# Patient Record
Sex: MALE | Race: BLACK OR AFRICAN AMERICAN | Employment: OTHER | URBAN - METROPOLITAN AREA
[De-identification: names, ages, dates, MRNs, and addresses within clinical notes are randomized per-mention and may not be internally consistent; named-entity substitution may affect disease eponyms.]

---

## 2018-03-29 ENCOUNTER — HOSPITAL ENCOUNTER (EMERGENCY)
Age: 53
Discharge: HOME | End: 2018-03-29
Attending: EMERGENCY MEDICINE
Payer: MEDICARE

## 2018-03-29 ENCOUNTER — APPOINTMENT (OUTPATIENT)
Dept: GENERAL RADIOLOGY | Age: 53
End: 2018-03-29
Attending: EMERGENCY MEDICINE
Payer: MEDICARE

## 2018-03-29 VITALS
TEMPERATURE: 98.9 F | HEART RATE: 97 BPM | RESPIRATION RATE: 16 BRPM | SYSTOLIC BLOOD PRESSURE: 174 MMHG | HEIGHT: 70 IN | OXYGEN SATURATION: 100 % | BODY MASS INDEX: 34.65 KG/M2 | WEIGHT: 242 LBS | DIASTOLIC BLOOD PRESSURE: 72 MMHG

## 2018-03-29 DIAGNOSIS — S49.92XA SHOULDER INJURY, LEFT, INITIAL ENCOUNTER: Primary | ICD-10-CM

## 2018-03-29 DIAGNOSIS — S80.212A KNEE ABRASION, LEFT, INITIAL ENCOUNTER: ICD-10-CM

## 2018-03-29 PROCEDURE — A4565 SLINGS: HCPCS

## 2018-03-29 PROCEDURE — 73060 X-RAY EXAM OF HUMERUS: CPT

## 2018-03-29 PROCEDURE — 73562 X-RAY EXAM OF KNEE 3: CPT

## 2018-03-29 PROCEDURE — 99282 EMERGENCY DEPT VISIT SF MDM: CPT | Performed by: EMERGENCY MEDICINE

## 2018-03-29 PROCEDURE — 73000 X-RAY EXAM OF COLLAR BONE: CPT

## 2018-03-29 RX ORDER — DICLOFENAC SODIUM 50 MG/1
50 TABLET, DELAYED RELEASE ORAL 2 TIMES DAILY
Qty: 14 TAB | Refills: 0 | Status: SHIPPED | OUTPATIENT
Start: 2018-03-29 | End: 2021-01-11

## 2018-03-29 NOTE — ED PROVIDER NOTES
HPI Comments: 63-year-old gentleman presents with concerns about pain in his left shoulder and left knee after a fall yesterday. He said he thinks that his blood sugar dropped low and he got lightheaded and fell into a car. City's having severe left shoulder pain and some mild left knee pain. He is able to walk on his left knee with a limp but he says putting any weight on his left shoulder is extremely painful. Overall he rates the pain as a 10 out of 10. Denies hitting his head and has no neck or back pain. Triage plan: I will get x-rays of his knee and shoulder. Patient was seen in triage, a brief history and physical was done. Labs and/or other studies were ordered pending placement of patient in the back. Jenny Dickson. Angeles Nunez MD    Elements of this note were created using speech recognition software. As such, errors of speech recognition may be present. Patient is a 50 y.o. male presenting with fall. The history is provided by the patient. Fall   Pertinent negatives include no fever. Past Medical History:   Diagnosis Date    Diabetes ([address]     Hypertension        Past Surgical History:   Procedure Laterality Date    HX ORTHOPAEDIC      right hip pin         No family history on file. Social History     Social History    Marital status: LEGALLY      Spouse name: N/A    Number of children: N/A    Years of education: N/A     Occupational History    Not on file. Social History Main Topics    Smoking status: Current Every Day Smoker     Packs/day: 0.50     Years: 10.00    Smokeless tobacco: Not on file    Alcohol use Yes      Comment: occasionally    Drug use: No    Sexual activity: Not on file     Other Topics Concern    Not on file     Social History Narrative         ALLERGIES: Codeine    Review of Systems   Constitutional: Negative for chills and fever. Cardiovascular: Negative. Gastrointestinal: Negative.     Musculoskeletal: Positive for arthralgias, gait problem, joint swelling and myalgias. Skin: Negative for color change and wound. Vitals:    03/29/18 1645   BP: 174/72   Pulse: 97   Resp: 16   Temp: 98.9 °F (37.2 °C)   SpO2: 100%   Weight: 109.8 kg (242 lb)   Height: 5' 10\" (1.778 m)            Physical Exam   Constitutional: He is oriented to person, place, and time. He appears well-developed and well-nourished. Musculoskeletal:   Patient has tenderness to palpation over his left AC joint and left upper humerus. Also has some pain on the distal portion of the left clavicle. Patient is a minor abrasion to the left knee but has no ligamentous instability. He is tender over the area of the abrasion. Neurological: He is alert and oriented to person, place, and time. Skin: Skin is warm and dry. Nursing note and vitals reviewed. MDM  Number of Diagnoses or Management Options  Diagnosis management comments: I will check an x-ray of his shoulder and knee. X-rays are negative. I will discharge him home with a prescription for some medicine to help with the pain and a sling for comfort.         ED Course       Procedures

## 2018-03-29 NOTE — ED NOTES
I have reviewed discharge instructions with the patient. The patient verbalized understanding. Patient left ED via Discharge Method: ambulatory to Home with self. Opportunity for questions and clarification provided. Patient given 1 scripts. To continue your aftercare when you leave the hospital, you may receive an automated call from our care team to check in on how you are doing. This is a free service and part of our promise to provide the best care and service to meet your aftercare needs.  If you have questions, or wish to unsubscribe from this service please call [phone]. [facility].

## 2018-03-29 NOTE — DISCHARGE INSTRUCTIONS
Wear the sling as needed for comfort. Try to increase the range of motion in your shoulder a little every day until your back to a normal range of motion. Follow-up with your primary care doctor if you are not back to normal in 7-10 days.

## 2018-03-29 NOTE — ED TRIAGE NOTES
PMD-None. Pt c/o left shoulder and left knee pain after a fall yesterday. Abrasion noted to left knee.

## 2019-11-20 ENCOUNTER — APPOINTMENT (OUTPATIENT)
Dept: GENERAL RADIOLOGY | Age: 54
End: 2019-11-20
Attending: EMERGENCY MEDICINE
Payer: MEDICARE

## 2019-11-20 ENCOUNTER — HOSPITAL ENCOUNTER (EMERGENCY)
Age: 54
Discharge: HOME | End: 2019-11-20
Attending: EMERGENCY MEDICINE
Payer: MEDICARE

## 2019-11-20 VITALS
DIASTOLIC BLOOD PRESSURE: 88 MMHG | TEMPERATURE: 98 F | OXYGEN SATURATION: 100 % | WEIGHT: 238 LBS | BODY MASS INDEX: 34.07 KG/M2 | HEIGHT: 70 IN | SYSTOLIC BLOOD PRESSURE: 174 MMHG | RESPIRATION RATE: 16 BRPM | HEART RATE: 71 BPM

## 2019-11-20 DIAGNOSIS — M25.462 EFFUSION OF LEFT KNEE: ICD-10-CM

## 2019-11-20 DIAGNOSIS — M25.562 ACUTE PAIN OF LEFT KNEE: Primary | ICD-10-CM

## 2019-11-20 LAB
APPEARANCE FLD: NORMAL
COLOR FLD: NORMAL
GLUCOSE FLD-MCNC: NORMAL MG/DL
LYMPHOCYTES NFR BRONCH MANUAL: 3 %
NEUTROPHILS NFR BRONCH MANUAL: 97 %
NUC CELL # FLD: NORMAL /CU MM
RBC # FLD: NORMAL /CU MM
SPECIMEN SOURCE FLD: NORMAL
SPECIMEN SOURCE FLD: NORMAL

## 2019-11-20 PROCEDURE — 82945 GLUCOSE OTHER FLUID: CPT

## 2019-11-20 PROCEDURE — 87205 SMEAR GRAM STAIN: CPT

## 2019-11-20 PROCEDURE — 73562 X-RAY EXAM OF KNEE 3: CPT

## 2019-11-20 PROCEDURE — 75810000054 HC ARTHOCENTISIS JOINT: Performed by: EMERGENCY MEDICINE

## 2019-11-20 PROCEDURE — 99283 EMERGENCY DEPT VISIT LOW MDM: CPT | Performed by: EMERGENCY MEDICINE

## 2019-11-20 PROCEDURE — 89050 BODY FLUID CELL COUNT: CPT

## 2019-11-20 PROCEDURE — 89060 EXAM SYNOVIAL FLUID CRYSTALS: CPT

## 2019-11-20 PROCEDURE — 74011250637 HC RX REV CODE- 250/637: Performed by: EMERGENCY MEDICINE

## 2019-11-20 RX ORDER — HYDROCODONE BITARTRATE AND ACETAMINOPHEN 5; 325 MG/1; MG/1
1-2 TABLET ORAL
Qty: 17 TAB | Refills: 0 | Status: SHIPPED | OUTPATIENT
Start: 2019-11-20 | End: 2019-11-25

## 2019-11-20 RX ORDER — NAPROXEN 250 MG/1
500 TABLET ORAL
Status: COMPLETED | OUTPATIENT
Start: 2019-11-20 | End: 2019-11-20

## 2019-11-20 RX ORDER — OXYCODONE HYDROCHLORIDE 5 MG/1
5 TABLET ORAL
Status: COMPLETED | OUTPATIENT
Start: 2019-11-20 | End: 2019-11-20

## 2019-11-20 RX ADMIN — NAPROXEN 500 MG: 250 TABLET ORAL at 11:56

## 2019-11-20 RX ADMIN — OXYCODONE HYDROCHLORIDE 5 MG: 5 TABLET ORAL at 11:56

## 2019-11-20 NOTE — ED TRIAGE NOTES
Pt to triage via w/c with c/o left knee pain since yesterday after falling into a \"sink hole\" in his yard where a tree was cut down. Pt states he was in the hole up to waist but left leg was folded up and partially out of the hole. Pt reports swelling and pain when attempting to bear weight on the left leg.

## 2019-11-20 NOTE — ED NOTES
I have reviewed discharge instructions with the patient. The patient verbalized understanding. Patient left ED via Discharge Method: ambulatory to Home with (family). Opportunity for questions and clarification provided. Patient given 1 scripts. To continue your aftercare when you leave the hospital, you may receive an automated call from our care team to check in on how you are doing. This is a free service and part of our promise to provide the best care and service to meet your aftercare needs.  If you have questions, or wish to unsubscribe from this service please call [phone]. Thank you for Choosing our [address] [place] [address] Emergency Department.

## 2019-11-20 NOTE — ED PROVIDER NOTES
[facility] Emergency Department  Arrival Date/Time: 11/20/2019 10:33 AM      Deyanira Stone  MRN: [id number]    YOB: 1965   48 y.o. male    [facility] EMERGENCY DEPT FT12/12  Seen on 11/20/2019 @ 11:25 AM      Chief Complaint   Patient presents with    Knee Pain     HPI: 82-year-old male presents to the emergency department left knee pain. Patient stepped in a sink hole in his yard that was left after removing a tree. Weight went forward. Had sudden pain. Has a no effusion. Significant pain. Increased with movement. Slightly better with rest.   HPI    Historian: patient    Review of Systems:  No fever  No chills    Allergies: Allergies   Allergen Reactions    Codeine Rash     Pt denies allergy         Key Anti-Platelet Anticoagulant Meds     The patient is on no antiplatelet meds or anticoagulants. Physical Exam:  Nursing documentation reviewed. Vitals:    11/20/19 1027   BP: (!) 185/92   Pulse: 68   Resp: 16   Temp: 98.2 °F (36.8 °C)   SpO2: 100%    Vital signs were reviewed. Physical Exam  Constitutional:       Appearance: Normal appearance. Cardiovascular:      Rate and Rhythm: Normal rate. Musculoskeletal:      Left knee: He exhibits effusion. Skin:     General: Skin is warm and dry. Neurological:      Mental Status: He is alert. Left Knee Exam     Tenderness   The patient is experiencing tenderness in the lateral joint line, LCL, MCL and medial joint line.     Range of Motion   Extension: abnormal   Flexion: abnormal     Other   Erythema: present  Swelling: moderate  Effusion: effusion present                 MEDICAL DECISION MAKING:   This is a new problem that does need additional workup  Labs/Radiographs/ECG were ordered: yesno}  CT/US/XRay/MRI were visualized by me: yes    The patient's problem is: moderate  The Diagnostic Options are: minuimal risk  The Management Options are: moderate risk    Data:    Lab findings during this visit (only abnormal values will be noted, if no value noted then the result   was normal range): Labs Reviewed - No data to display     Radiology studies during this visit: Xr Knee Lt 3 V    Result Date: 11/20/2019  IMPRESSION: Mild osteoarthritis and large joint effusion        Medications given in the ED: Medications - No data to display     Recheck:   57-year-old male with large left knee effusion after injury yesterday. Will perform an arthrocentesis for comfort. Place patient knee immobilizer. Pain medication. Follow-up with orthopedics. Procedure Documentation: Arthrocentesis  Date/Time: 11/20/2019 11:27 AM  Performed by: Tevin Salinas MD  Authorized by: Tevin Salinas MD     Consent:     Consent obtained:  Verbal and written    Consent given by:  Patient    Risks discussed:  Bleeding and infection    Alternatives discussed:  No treatment and delayed treatment  Location:     Location:  Knee    Knee:  L knee  Anesthesia (see MAR for exact dosages): Anesthesia method:  Local infiltration    Local anesthetic:  Lidocaine 1% w/o epi  Procedure details:     Preparation: Patient was prepped and draped in usual sterile fashion      Needle gauge:  18 G    Ultrasound guidance: no      Approach:  Lateral    Aspirate amount:  60    Aspirate characteristics:  Bloody    Steroid injected: no      Specimen collected: yes    Post-procedure details:     Dressing:  Adhesive bandage    Patient tolerance of procedure: Tolerated well, no immediate complications         Assessment and Plan:      Impression:     ICD-10-CM ICD-9-CM   1. Acute pain of left knee M25.562 719.46   2.  Effusion of left knee M25.462 719.06        Condition at disposition: stable    Disposition: Discharged     Follow-up:   Follow-up Information     Follow up With Specialties Details Why [facility] EMERGENCY DEPT Emergency Medicine   [facility] [address]  Call today  [address] [place] [address] [phone]  [phone]           Discharge Medications:   Current Discharge Medication List      CONTINUE these medications which have CHANGED    Details   HYDROcodone-acetaminophen (NORCO) 5-325 mg per tablet Take 1-2 Tabs by mouth every six (6) hours as needed for Pain for up to 5 days. Max Daily Amount: 8 Tabs. Qty: 17 Tab, Refills: 0    Associated Diagnoses: Acute pain of left knee; Effusion of left knee              Cassie Warner MD; 11/20/2019 @11:25 AM          Past Medical History: Primary Care Doctor: Other, MD Rahul     Past Medical History:   Diagnosis Date    Diabetes ([address]     Hypertension      Past Surgical History:   Procedure Laterality Date    HX ORTHOPAEDIC      right hip pin     Social History     Tobacco Use    Smoking status: Current Every Day Smoker     Packs/day: 0.50     Years: 10.00     Pack years: 5.00   Substance Use Topics    Alcohol use: Yes     Comment: occasionally    Drug use: No      Home Medication:   Prior to Admission Medications   Prescriptions Last Dose Informant Patient Reported? Taking? HYDROcodone-acetaminophen (NORCO) 5-325 mg per tablet   No No   Sig: Take 1-2 Tabs by mouth every six (6) hours as needed for Pain. Max Daily Amount: 8 Tabs. Omeprazole delayed release (PRILOSEC D/R) 20 mg tablet   Yes No   Sig: Take 20 mg by mouth daily. amLODIPine (NORVASC) 5 mg tablet   No No   Sig: Take 1 Tab by mouth daily. diclofenac EC (VOLTAREN) 50 mg EC tablet   No No   Sig: Take 1 Tab by mouth two (2) times a day. insulin detemir (LEVEMIR) 100 unit/mL injection   Yes No   Sig: 10 Units by SubCUTAneous route every morning. lisinopril (PRINIVIL, ZESTRIL) 20 mg tablet   Yes No   Sig: Take 20 mg by mouth daily. methocarbamol (ROBAXIN) 750 mg tablet   No No   Sig: Take 1 Tab by mouth three (3) times daily. triamterene-hydrochlorothiazide (DYAZIDE) 37.5-25 mg per capsule   Yes No   Sig: Take 1 Cap by mouth daily. trimethoprim-sulfamethoxazole (BACTRIM DS, SEPTRA DS) 160-800 mg per tablet   No No   Sig: Take 1 Tab by mouth two (2) times a day.       Facility-Administered Medications: None

## 2019-11-20 NOTE — DISCHARGE INSTRUCTIONS
Rest  Ice knee  -- BIG/HUGE bag of ice to knee 30 minutes 3-4 times a day  Aleve 2 tablets 2 times a day

## 2019-11-22 LAB
BACTERIA SPEC CULT: NORMAL
BODY FLD TYPE: NORMAL
CRYSTALS FLD MICRO: NORMAL
GRAM STN SPEC: NORMAL
GRAM STN SPEC: NORMAL
SERVICE CMNT-IMP: NORMAL

## 2020-01-08 ENCOUNTER — HOSPITAL ENCOUNTER (EMERGENCY)
Age: 55
Discharge: HOME | End: 2020-01-08
Attending: EMERGENCY MEDICINE
Payer: MEDICARE

## 2020-01-08 VITALS
OXYGEN SATURATION: 97 % | TEMPERATURE: 98 F | SYSTOLIC BLOOD PRESSURE: 154 MMHG | HEART RATE: 90 BPM | RESPIRATION RATE: 16 BRPM | DIASTOLIC BLOOD PRESSURE: 80 MMHG | WEIGHT: 238 LBS | BODY MASS INDEX: 34.07 KG/M2 | HEIGHT: 70 IN

## 2020-01-08 DIAGNOSIS — T14.8XXA MUSCLE STRAIN: ICD-10-CM
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
## 2020-01-08 PROCEDURE — 99283 EMERGENCY DEPT VISIT LOW MDM: CPT | Performed by: EMERGENCY MEDICINE

## 2020-01-08 RX ORDER — METHOCARBAMOL 750 MG/1
750 TABLET, FILM COATED ORAL 3 TIMES DAILY
Qty: 30 TAB | Refills: 0 | Status: SHIPPED | OUTPATIENT
Start: 2020-01-08 | End: 2021-01-11

## 2020-01-08 NOTE — ED NOTES
I have reviewed discharge instructions with the patient. The patient verbalized understanding. Patient left ED via Discharge Method: ambulatory to Home with self    Opportunity for questions and clarification provided. Patient given 1 scripts. To continue your aftercare when you leave the hospital, you may receive an automated call from our care team to check in on how you are doing. This is a free service and part of our promise to provide the best care and service to meet your aftercare needs.  If you have questions, or wish to unsubscribe from this service please call [phone]. Thank you for Choosing our [facility] Emergency Department.
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
## 2020-06-26 ENCOUNTER — HOSPITAL ENCOUNTER (EMERGENCY)
Age: 55
Discharge: HOME | End: 2020-06-26
Attending: EMERGENCY MEDICINE
Payer: MEDICARE

## 2020-06-26 VITALS
HEIGHT: 70 IN | TEMPERATURE: 98.1 F | BODY MASS INDEX: 34.07 KG/M2 | SYSTOLIC BLOOD PRESSURE: 166 MMHG | OXYGEN SATURATION: 100 % | WEIGHT: 238 LBS | HEART RATE: 86 BPM | DIASTOLIC BLOOD PRESSURE: 98 MMHG | RESPIRATION RATE: 20 BRPM

## 2020-06-26 DIAGNOSIS — N48.30 PRIAPISM: Primary | ICD-10-CM

## 2020-06-26 LAB
ALBUMIN SERPL-MCNC: 3.3 G/DL (ref 3.5–5)
ALBUMIN/GLOB SERPL: 0.5 {RATIO} (ref 1.2–3.5)
ALP SERPL-CCNC: 106 U/L (ref 50–136)
ALT SERPL-CCNC: 31 U/L (ref 12–65)
ANION GAP SERPL CALC-SCNC: 7 MMOL/L (ref 7–16)
AST SERPL-CCNC: 34 U/L (ref 15–37)
BASOPHILS # BLD: 0.1 K/UL (ref 0–0.2)
BASOPHILS NFR BLD: 1 % (ref 0–2)
BILIRUB SERPL-MCNC: 0.8 MG/DL (ref 0.2–1.1)
BUN SERPL-MCNC: 19 MG/DL (ref 6–23)
CALCIUM SERPL-MCNC: 9.4 MG/DL (ref 8.3–10.4)
CHLORIDE SERPL-SCNC: 106 MMOL/L (ref 98–107)
CO2 SERPL-SCNC: 23 MMOL/L (ref 21–32)
CREAT SERPL-MCNC: 1.34 MG/DL (ref 0.8–1.5)
DIFFERENTIAL METHOD BLD: ABNORMAL
EOSINOPHIL # BLD: 0.3 K/UL (ref 0–0.8)
EOSINOPHIL NFR BLD: 3 % (ref 0.5–7.8)
ERYTHROCYTE [DISTWIDTH] IN BLOOD BY AUTOMATED COUNT: 12.5 % (ref 11.9–14.6)
GLOBULIN SER CALC-MCNC: 6.1 G/DL (ref 2.3–3.5)
GLUCOSE SERPL-MCNC: 105 MG/DL (ref 65–100)
HCT VFR BLD AUTO: 49.8 % (ref 41.1–50.3)
HGB BLD-MCNC: 16.1 G/DL (ref 13.6–17.2)
IMM GRANULOCYTES # BLD AUTO: 0 K/UL (ref 0–0.5)
IMM GRANULOCYTES NFR BLD AUTO: 0 % (ref 0–5)
LYMPHOCYTES # BLD: 0.5 K/UL (ref 0.5–4.6)
LYMPHOCYTES NFR BLD: 6 % (ref 13–44)
MCH RBC QN AUTO: 33.1 PG (ref 26.1–32.9)
MCHC RBC AUTO-ENTMCNC: 32.3 G/DL (ref 31.4–35)
MCV RBC AUTO: 102.3 FL (ref 79.6–97.8)
MONOCYTES # BLD: 0.3 K/UL (ref 0.1–1.3)
MONOCYTES NFR BLD: 4 % (ref 4–12)
NEUTS SEG # BLD: 7.2 K/UL (ref 1.7–8.2)
NEUTS SEG NFR BLD: 85 % (ref 43–78)
NRBC # BLD: 0 K/UL (ref 0–0.2)
PLATELET # BLD AUTO: 298 K/UL (ref 150–450)
PMV BLD AUTO: 11.1 FL (ref 9.4–12.3)
POTASSIUM SERPL-SCNC: 4.4 MMOL/L (ref 3.5–5.1)
PROT SERPL-MCNC: 9.4 G/DL (ref 6.3–8.2)
RBC # BLD AUTO: 4.87 M/UL (ref 4.23–5.6)
SODIUM SERPL-SCNC: 136 MMOL/L (ref 136–145)
WBC # BLD AUTO: 8.4 K/UL (ref 4.3–11.1)

## 2020-06-26 PROCEDURE — 74011250636 HC RX REV CODE- 250/636: Performed by: EMERGENCY MEDICINE

## 2020-06-26 PROCEDURE — 74011000250 HC RX REV CODE- 250: Performed by: EMERGENCY MEDICINE

## 2020-06-26 PROCEDURE — 80053 COMPREHEN METABOLIC PANEL: CPT

## 2020-06-26 PROCEDURE — 99283 EMERGENCY DEPT VISIT LOW MDM: CPT

## 2020-06-26 PROCEDURE — 85025 COMPLETE CBC W/AUTO DIFF WBC: CPT

## 2020-06-26 PROCEDURE — 75810000279 HC INJ/IRRIG FOR PRIAPISM

## 2020-06-26 RX ORDER — LIDOCAINE HYDROCHLORIDE 10 MG/ML
10 INJECTION INFILTRATION; PERINEURAL
Status: COMPLETED | OUTPATIENT
Start: 2020-06-26 | End: 2020-06-26

## 2020-06-26 RX ADMIN — PHENYLEPHRINE HYDROCHLORIDE 10 ML: 10 INJECTION INTRAVENOUS at 17:45

## 2020-06-26 RX ADMIN — LIDOCAINE HYDROCHLORIDE 10 ML: 10 INJECTION, SOLUTION INFILTRATION; PERINEURAL at 17:45

## 2020-06-26 NOTE — ED NOTES
I have reviewed discharge instructions with the patient. The patient verbalized understanding. Patient left ED via Discharge Method: ambulatory to Home with son. Opportunity for questions and clarification provided. Patient given 0 scripts. To continue your aftercare when you leave the hospital, you may receive an automated call from our care team to check in on how you are doing. This is a free service and part of our promise to provide the best care and service to meet your aftercare needs.  If you have questions, or wish to unsubscribe from this service please call [phone]. Thank you for Choosing our [facility] Emergency Department.

## 2020-06-26 NOTE — ED NOTES
Report received from [facility] to assume patient care at this time. Per asher RN pt awaiting blood work results for discharge.

## 2020-06-26 NOTE — ED PROVIDER NOTES
Néstor De Oliveira  4:48 PM  Patient seen by me in triage. Has had an erection since 5 AM this morning. No previous similar episodes. Has high blood pressure and diabetes no history of sickle cell disease. Patient will be seen by provider in the back      Néstor De Oliveira.  Patient seen by me in the back in the ER. Remains with erection. Urology consulted. The history is provided by the patient. No  was used. Penis Pain   This is a new problem. Episode onset: 0500. The problem occurs constantly. The problem has not changed since onset. Primary symptoms include penile pain and priapism. The symptoms occur spontaneously. Pertinent negatives include no nausea, no vomiting, no abdominal pain and no diarrhea. Past Medical History:   Diagnosis Date    Diabetes ([facility] [address]     Hypertension        Past Surgical History:   Procedure Laterality Date    HX ORTHOPAEDIC      right hip pin         No family history on file.     Social History     Socioeconomic History    Marital status: SINGLE     Spouse name: Not on file    Number of children: Not on file    Years of education: Not on file    Highest education level: Not on file   Occupational History    Not on file   Social Needs    Financial resource strain: Not on file    Food insecurity     Worry: Not on file     Inability: Not on file    Transportation needs     Medical: Not on file     Non-medical: Not on file   Tobacco Use    Smoking status: Current Every Day Smoker     Packs/day: 0.50     Years: 10.00     Pack years: 5.00   Substance and Sexual Activity    Alcohol use: Yes     Comment: occasionally    Drug use: No    Sexual activity: Not on file   Lifestyle    Physical activity     Days per week: Not on file     Minutes per session: Not on file    Stress: Not on file   Relationships    Social connections     Talks on phone: Not on file     Gets together: Not on file     Attends Episcopalian service: Not on file     Active member of club or organization: Not on file     Attends meetings of clubs or organizations: Not on file     Relationship status: Not on file    Intimate partner violence     Fear of current or ex partner: Not on file     Emotionally abused: Not on file     Physically abused: Not on file     Forced sexual activity: Not on file   Other Topics Concern    Not on file   Social History Narrative    Not on file         ALLERGIES: Codeine    Review of Systems   Constitutional: Negative for chills and fever. Eyes: Negative for pain and redness. Respiratory: Negative for chest tightness, shortness of breath and wheezing. Cardiovascular: Negative for chest pain and leg swelling. Gastrointestinal: Negative for abdominal pain, diarrhea, nausea and vomiting. Genitourinary: Positive for penile pain. Musculoskeletal: Negative for back pain, gait problem, neck pain and neck stiffness. Skin: Negative for color change and rash. Neurological: Negative for weakness, numbness and headaches. Vitals:    06/26/20 1646   BP: 174/70   Pulse: 100   Resp: 16   Temp: 98.4 °F (36.9 °C)   SpO2: 98%   Weight: 108 kg (238 lb)   Height: 5' 10\" (1.778 m)            Physical Exam  Constitutional:       Appearance: He is well-developed. HENT:      Head: Normocephalic and atraumatic. Neck:      Musculoskeletal: Normal range of motion and neck supple. Cardiovascular:      Rate and Rhythm: Normal rate and regular rhythm. Pulmonary:      Effort: Pulmonary effort is normal.      Breath sounds: Normal breath sounds. Abdominal:      General: Bowel sounds are normal.      Palpations: Abdomen is soft. Tenderness: There is no abdominal tenderness. Genitourinary:     Comments: Priapism. Musculoskeletal: Normal range of motion. Skin:     General: Skin is warm and dry. Neurological:      Mental Status: He is alert and oriented to person, place, and time.           MDM  Number of Diagnoses or Management Options  Diagnosis management comments: Urology consulted. Will come and drain penis. Will discharge with follow up.         Amount and/or Complexity of Data Reviewed  Clinical lab tests: ordered and reviewed  Tests in the medicine section of CPT®: ordered and reviewed    Patient Progress  Patient progress: stable         Procedures

## 2020-06-26 NOTE — DISCHARGE INSTRUCTIONS
Patient Education        Priapism (Prolonged Erection): Care Instructions  Your Care Instructions     Priapism (say \"APJ-cr-xhm-um\") is an erection that does not go away. This happens when the penis fills with fluid without sexual stimulation. And it does not go away after orgasm. It may last on and off for days or weeks. The cause of priapism is often not known. But it can happen to men who have sickle cell disease or diabetes. Taking or using medicine for erection problems may also cause priapism. Your doctor may have removed blood from your penis to ease the pain. And he or she may have given you a shot of medicine to soften the erection. Follow-up care is a key part of your treatment and safety. Be sure to make and go to all appointments, and call your doctor if you are having problems. It's also a good idea to know your test results and keep a list of the medicines you take. How can you care for yourself at home? · Put ice or a cold pack on the area for 10 to 20 minutes at a time. Put a thin cloth between the ice and your skin. · Urinate often. Don't wait until you feel the need. · Avoid the medicine that may have caused the painful erection. When should you call for help? Call your doctor now or seek immediate medical care if:  · You have a new painful erection that does not go away. Watch closely for changes in your health, and be sure to contact your doctor if:  · You do not get better as expected. Where can you learn more? Go to http://sapphire-jose.info/  Enter B643 in the search box to learn more about \"Priapism (Prolonged Erection): Care Instructions. \"  Current as of: February 11, 2020               Content Version: 12.5  © 8099-7925 [employer]wise, Incorporated. Care instructions adapted under license by [employer] (which disclaims liability or warranty for this information).  If you have questions about a medical condition or this instruction, always ask your healthcare professional. [address] any warranty or liability for your use of this information.

## 2020-06-26 NOTE — ED TRIAGE NOTES
Pt states he woke up at 0500 this morning with an erection. States it has not gone down and is now painful. Denies this happening before. Ambulatory to triage wearing mask.

## 2020-06-26 NOTE — ED NOTES
Collin Arvizu notified of pt BP at this time, pt states \"I have 2 blood pressure pills I take in the monring\". Per MD continue with discharge at this time.

## 2020-06-27 NOTE — CONSULTS
[address]    Name:  Eduard Brasher  MR#:  [id number]  :  1965  ACCOUNT #:  [de-identified]  DATE OF SERVICE:  2020    CONSULTING DOCTOR:  Tameka Martinez  REASON FOR CONSULTATION:  Priapism. HISTORY OF PRESENT ILLNESS:  A 53-year-old -American male woke this morning with an erection. The erection did not go down and got more severe as the day progressed. He came to the [facility] in considerable pain from priapism since this morning. He denies any illicit drug use. He is not taking trazodone. He does not have a history of sickle cell anemia. He has otherwise been healthy. He does have some high blood pressure and takes one medication for that. He has had no recent medication changes. PAST MEDICAL HISTORY:  Includes diabetes and hypertension. MEDICATIONS:  Robaxin 750 mg t.i.d., Voltaren, Prilosec. He takes insulin, Prinivil, Norvasc. PHYSICAL EXAMINATION:  Healthy male in moderate distress. His penis is circumcised with a very erect penis. Testicles are normal.    After prepping the shaft of the penis with Betadine, I used lidocaine to infiltrate the skin on the lateral surface of the left penile shaft. A butterfly needle was inserted. I used a 10 mL syringe to aspirate a very old venous tight blood. We also had a solution of phenylephrine at a concentration of 100 mcg/mL. This was injected periodically, had 1 mL samples for a total of 10 mL. I also placed another butterfly syringe on the right side in a similar fashion in alternated aspiration between the left and the right side. After about 15 minutes of this, we were able to get the penis to go completely flaccid. He tolerated this well. As noted, we had injected a total of 10 mL of the phenylephrine as well. After removing the butterfly needles, pressure was applied to the penile shaft manually. He did not have any repeat of his priapism.     ASSESSMENT:  Idiopathic priapism. RECOMMENDATION:  I told the patient to get some Sudafed so that he could take it if he started to have another erection. I will follow him up in our office next week. He is going to have a CBC drawn prior to discharge.       Tamara Lewis MD      [id number]  D:  06/26/2020 18:25  T:  06/26/2020 21:27  JOB #:  [id number]

## 2021-01-11 ENCOUNTER — HOSPITAL ENCOUNTER (OUTPATIENT)
Dept: SURGERY | Age: 56
Discharge: HOME | End: 2021-01-11
Attending: ORTHOPAEDIC SURGERY
Payer: MEDICARE

## 2021-01-11 ENCOUNTER — HOSPITAL ENCOUNTER (OUTPATIENT)
Dept: PHYSICAL THERAPY | Age: 56
Discharge: HOME | End: 2021-01-11
Attending: ORTHOPAEDIC SURGERY
Payer: MEDICARE

## 2021-01-11 ENCOUNTER — HOME HEALTH ADMISSION (OUTPATIENT)
Dept: HOME HEALTH SERVICES | Facility: HOME HEALTH | Age: 56
End: 2021-01-11
Payer: MEDICARE

## 2021-01-11 DIAGNOSIS — R06.83 SNORING: Primary | ICD-10-CM

## 2021-01-11 LAB
AMORPH CRY URNS QL MICRO: ABNORMAL
ANION GAP SERPL CALC-SCNC: 7 MMOL/L (ref 7–16)
APPEARANCE UR: ABNORMAL
APTT PPP: 26.1 SEC (ref 24.1–35.1)
ATRIAL RATE: 56 BPM
BACTERIA SPEC CULT: NORMAL
BACTERIA URNS QL MICRO: ABNORMAL /HPF
BASOPHILS # BLD: 0.1 K/UL (ref 0–0.2)
BASOPHILS NFR BLD: 2 % (ref 0–2)
BILIRUB UR QL: NEGATIVE
BUN SERPL-MCNC: 19 MG/DL (ref 6–23)
CALCIUM SERPL-MCNC: 9.2 MG/DL (ref 8.3–10.4)
CALCULATED P AXIS, ECG09: 35 DEGREES
CALCULATED R AXIS, ECG10: 44 DEGREES
CALCULATED T AXIS, ECG11: 45 DEGREES
CASTS URNS QL MICRO: ABNORMAL /LPF
CHLORIDE SERPL-SCNC: 105 MMOL/L (ref 98–107)
CO2 SERPL-SCNC: 27 MMOL/L (ref 21–32)
COLOR UR: YELLOW
CREAT SERPL-MCNC: 1.64 MG/DL (ref 0.8–1.5)
CRYSTALS URNS QL MICRO: 0 /LPF
DIAGNOSIS, 93000: NORMAL
DIFFERENTIAL METHOD BLD: ABNORMAL
EOSINOPHIL # BLD: 0.3 K/UL (ref 0–0.8)
EOSINOPHIL NFR BLD: 5 % (ref 0.5–7.8)
EPI CELLS #/AREA URNS HPF: ABNORMAL /HPF
ERYTHROCYTE [DISTWIDTH] IN BLOOD BY AUTOMATED COUNT: 12.3 % (ref 11.9–14.6)
EST. AVERAGE GLUCOSE BLD GHB EST-MCNC: NORMAL MG/DL
GLUCOSE SERPL-MCNC: 101 MG/DL (ref 65–100)
GLUCOSE UR STRIP.AUTO-MCNC: NEGATIVE MG/DL
HBA1C MFR BLD: 4.3 % (ref 4.2–6.3)
HCT VFR BLD AUTO: 43.4 % (ref 41.1–50.3)
HGB BLD-MCNC: 14.6 G/DL (ref 13.6–17.2)
HGB UR QL STRIP: NEGATIVE
IMM GRANULOCYTES # BLD AUTO: 0 K/UL (ref 0–0.5)
IMM GRANULOCYTES NFR BLD AUTO: 0 % (ref 0–5)
INR PPP: 0.9
KETONES UR QL STRIP.AUTO: ABNORMAL MG/DL
LEUKOCYTE ESTERASE UR QL STRIP.AUTO: ABNORMAL
LYMPHOCYTES # BLD: 2 K/UL (ref 0.5–4.6)
LYMPHOCYTES NFR BLD: 35 % (ref 13–44)
MCH RBC QN AUTO: 34 PG (ref 26.1–32.9)
MCHC RBC AUTO-ENTMCNC: 33.6 G/DL (ref 31.4–35)
MCV RBC AUTO: 100.9 FL (ref 79.6–97.8)
MONOCYTES # BLD: 0.5 K/UL (ref 0.1–1.3)
MONOCYTES NFR BLD: 9 % (ref 4–12)
MUCOUS THREADS URNS QL MICRO: 0 /LPF
NEUTS SEG # BLD: 2.8 K/UL (ref 1.7–8.2)
NEUTS SEG NFR BLD: 49 % (ref 43–78)
NITRITE UR QL STRIP.AUTO: NEGATIVE
NRBC # BLD: 0 K/UL (ref 0–0.2)
OTHER OBSERVATIONS,UCOM: ABNORMAL
P-R INTERVAL, ECG05: 134 MS
PH UR STRIP: 7 [PH] (ref 5–9)
PLATELET # BLD AUTO: 263 K/UL (ref 150–450)
PMV BLD AUTO: 11.7 FL (ref 9.4–12.3)
POTASSIUM SERPL-SCNC: 4 MMOL/L (ref 3.5–5.1)
PROT UR STRIP-MCNC: >300 MG/DL
PROTHROMBIN TIME: 13 SEC (ref 12.5–14.7)
Q-T INTERVAL, ECG07: 444 MS
QRS DURATION, ECG06: 102 MS
QTC CALCULATION (BEZET), ECG08: 428 MS
RBC # BLD AUTO: 4.3 M/UL (ref 4.23–5.6)
RBC #/AREA URNS HPF: ABNORMAL /HPF
SERVICE CMNT-IMP: NORMAL
SODIUM SERPL-SCNC: 139 MMOL/L (ref 136–145)
SP GR UR REFRACTOMETRY: 1.02 (ref 1–1.02)
TRICHOMONAS UR QL MICRO: ABNORMAL
UROBILINOGEN UR QL STRIP.AUTO: 1 EU/DL (ref 0.2–1)
VENTRICULAR RATE, ECG03: 56 BPM
WBC # BLD AUTO: 5.7 K/UL (ref 4.3–11.1)
WBC URNS QL MICRO: >100 /HPF

## 2021-01-11 PROCEDURE — 36415 COLL VENOUS BLD VENIPUNCTURE: CPT

## 2021-01-11 PROCEDURE — 80048 BASIC METABOLIC PNL TOTAL CA: CPT

## 2021-01-11 PROCEDURE — 85610 PROTHROMBIN TIME: CPT

## 2021-01-11 PROCEDURE — 87641 MR-STAPH DNA AMP PROBE: CPT

## 2021-01-11 PROCEDURE — 85730 THROMBOPLASTIN TIME PARTIAL: CPT

## 2021-01-11 PROCEDURE — 93005 ELECTROCARDIOGRAM TRACING: CPT

## 2021-01-11 PROCEDURE — 81001 URINALYSIS AUTO W/SCOPE: CPT

## 2021-01-11 PROCEDURE — 77030027138 HC INCENT SPIROMETER -A

## 2021-01-11 PROCEDURE — 83036 HEMOGLOBIN GLYCOSYLATED A1C: CPT

## 2021-01-11 PROCEDURE — 87086 URINE CULTURE/COLONY COUNT: CPT

## 2021-01-11 PROCEDURE — 97161 PT EVAL LOW COMPLEX 20 MIN: CPT

## 2021-01-11 PROCEDURE — 81015 MICROSCOPIC EXAM OF URINE: CPT

## 2021-01-11 PROCEDURE — 85025 COMPLETE CBC W/AUTO DIFF WBC: CPT

## 2021-01-11 RX ORDER — PRAVASTATIN SODIUM 40 MG/1
40 TABLET ORAL
COMMUNITY
End: 2021-01-11

## 2021-01-11 RX ORDER — ATORVASTATIN CALCIUM 40 MG/1
40 TABLET, FILM COATED ORAL
COMMUNITY

## 2021-01-11 RX ORDER — IBUPROFEN 200 MG
1 TABLET ORAL EVERY 24 HOURS
COMMUNITY

## 2021-01-11 RX ORDER — CHOLECALCIFEROL (VITAMIN D3) 125 MCG
CAPSULE ORAL DAILY
COMMUNITY

## 2021-01-11 RX ORDER — CHOLECALCIFEROL (VITAMIN D3) 125 MCG
CAPSULE ORAL AS NEEDED
COMMUNITY
End: 2021-01-11

## 2021-01-11 RX ORDER — GABAPENTIN 400 MG/1
400 CAPSULE ORAL
COMMUNITY
End: 2021-01-11

## 2021-01-11 RX ORDER — HYDROCODONE BITARTRATE AND ACETAMINOPHEN 5; 325 MG/1; MG/1
1 TABLET ORAL
COMMUNITY
End: 2021-02-04

## 2021-01-11 NOTE — PERIOP NOTES
Recent Results (from the past 8 hour(s))   CBC WITH AUTOMATED DIFF    Collection Time: 01/11/21 10:50 AM   Result Value Ref Range    WBC 5.7 4.3 - 11.1 K/uL    RBC 4.30 4.23 - 5.6 M/uL    HGB 14.6 13.6 - 17.2 g/dL    HCT 43.4 41.1 - 50.3 %    .9 (H) 79.6 - 97.8 FL    MCH 34.0 (H) 26.1 - 32.9 PG    MCHC 33.6 31.4 - 35.0 g/dL    RDW 12.3 11.9 - 14.6 %    PLATELET 250 935 - 469 K/uL    MPV 11.7 9.4 - 12.3 FL    ABSOLUTE NRBC 0.00 0.0 - 0.2 K/uL    DF AUTOMATED      NEUTROPHILS 49 43 - 78 %    LYMPHOCYTES 35 13 - 44 %    MONOCYTES 9 4.0 - 12.0 %    EOSINOPHILS 5 0.5 - 7.8 %    BASOPHILS 2 0.0 - 2.0 %    IMMATURE GRANULOCYTES 0 0.0 - 5.0 %    ABS. NEUTROPHILS 2.8 1.7 - 8.2 K/UL    ABS. LYMPHOCYTES 2.0 0.5 - 4.6 K/UL    ABS. MONOCYTES 0.5 0.1 - 1.3 K/UL    ABS. EOSINOPHILS 0.3 0.0 - 0.8 K/UL    ABS. BASOPHILS 0.1 0.0 - 0.2 K/UL    ABS. IMM.  GRANS. 0.0 0.0 - 0.5 K/UL   HEMOGLOBIN A1C WITH EAG    Collection Time: 01/11/21 10:50 AM   Result Value Ref Range    Hemoglobin A1c 4.3 4.20 - 6.30 %    Est. average glucose Cannot be calculated mg/dL   METABOLIC PANEL, BASIC    Collection Time: 01/11/21 10:50 AM   Result Value Ref Range    Sodium 139 136 - 145 mmol/L    Potassium 4.0 3.5 - 5.1 mmol/L    Chloride 105 98 - 107 mmol/L    CO2 27 21 - 32 mmol/L    Anion gap 7 7 - 16 mmol/L    Glucose 101 (H) 65 - 100 mg/dL    BUN 19 6 - 23 MG/DL    Creatinine 1.64 (H) 0.8 - 1.5 MG/DL    GFR est AA 57 (L) >60 ml/min/1.73m2    GFR est non-AA 47 (L) >60 ml/min/1.73m2    Calcium 9.2 8.3 - 10.4 MG/DL   PROTHROMBIN TIME + INR    Collection Time: 01/11/21 10:50 AM   Result Value Ref Range    Prothrombin time 13.0 12.5 - 14.7 sec    INR 0.9     PTT    Collection Time: 01/11/21 10:50 AM   Result Value Ref Range    aPTT 26.1 24.1 - 35.1 SEC

## 2021-01-11 NOTE — PROGRESS NOTES
Joint Camp Case Management note:  Patient screened in Prehab for discharge planning needs. Patient scheduled for a future total joint replacement. We discussed Home Health and equipment needed after surgery. List of Home Health providers offered. Patient w/o preference towards provider. Initial referral sent to [facility].  Will need a walker and 3-1 BSC

## 2021-01-11 NOTE — PERIOP NOTES
Patient verified name and . Order for consent    found in EHR and matches case posting; patient verified. Type 3 surgery, joint camp assessment complete. Labs per surgeon: CBC,BMP, PT/PTT, Hgb A1c ; results creatinine 1.64~abnormal~Tatiana Alanis NP made aware. Labs per anesthesia protocol: no additional  EKG:completed today per protocol and within anesthesia guidelines. Placed on chart for reference. ECHO 19; stress 19 EKG 19 available in Care EVerywhere for anesthesia reference. Pt c/o of foul odor when urinates; Urine specimen/culture collected per Evette Valencia NP order. Patient COVID test date 21 1010; Patient aware. The testing center [address] provided to the patient. MRSA/MSSA swab collected; pharmacy to review and dose antibiotic as appropriate. Hospital approved surgical skin cleanser and instructions to return bottle on DOS given per hospital policy. Patient provided with handouts including Guide to Surgery, Pain Management, Hand Hygiene, Blood Transfusion Education, and [facility] Brochure. Patient answered medical/surgical history questions at their best of ability. All prior to admission medications documented in [facility]. Original medication prescription bottle not visualized during patient appointment. Patient instructed to hold all vitamins 3 weeks prior to surgery and NSAIDS 5 days prior to surgery. Patient teach back successful and patient demonstrates knowledge of instruction.

## 2021-01-11 NOTE — PERIOP NOTES
PLEASE CONTINUE TAKING ALL PRESCRIPTION MEDICATIONS UP TO THE DAY OF SURGERY UNLESS OTHERWISE DIRECTED BELOw  DISCONTINUE all vitamins and supplements 21 days prior to surgery. DISCONTINUE Non-Steriodal Anti-Inflammatory (NSAIDS) such as Advil, Motrin, Aleve; Aspirin 5 days prior to surgery. Home Medications to take  the day of surgery    Amlodipine           Home Medications   to Hold   Avoid Tylenol/Acetaminophen products for 24 hours prior to surgery arrival   Avoid [place] for 24 hours prior to surgery arrival     Comments    Bring incentive spirometer and remaining Hibiclens on day of surgery      *Visitor policy of 1 visitor per patient discussed. Please do not bring home medications with you on the day of surgery unless otherwise directed by your nurse. If you are instructed to bring home medications, please give them to your nurse as they will be administered by the nursing staff. If you have any questions, please call [facility] Juanis Mckenzie [phone] or [facility] [phone]. A copy of this note was provided to the patient for reference.

## 2021-01-11 NOTE — PROGRESS NOTES
Dallas Born  : (50 y.o.) Joint Camp at [address], [facility] [address].  [phone]       Physical Therapy Prehab Plan of Treatment and Evaluation Summary:2021    ICD-10: Treatment Diagnosis:   · Pain in Right Hip (M25.551)  · Stiffness of Right Hip, Not elsewhere classified (M25.651)  · Difficulty in walking, Not elsewhere classified (R26.2)  Precautions/Allergies:   Codeine  MEDICAL/REFERRING DIAGNOSIS:  Ankylosis, right hip [M24.651]  REFERRING PHYSICIAN: Keven North,*  DATE OF SURGERY: 21    Assessment:   Comments:  Pt. Lives alone and plans to go home with support from his best friend. He states he has had a right michael in the past.  His right hip and knee rom is limted especially his hip flexion rom. He states he needs a right tka as well. PROBLEM LIST (Impacting functional limitations):  Mr. Joan Beckwith presents with the following right lower extremity(s) problems:  1. Strength  2. Range of Motion  3. Home Exercise Program  4. Pain   INTERVENTIONS PLANNED:  1. Home Exercise Program  2. Educational Discussion      TREATMENT PLAN: Effective Dates: 2021 TO 2021. Frequency/Duration: Patient to continue to perform home exercise program at least twice per day up until his surgery. GOALS: (Goals have been discussed and agreed upon with patient.)  Discharge Goals: Time Frame: 1 Day  1. Patient will demonstrate independence with a home exercise program designed to increase strength, range of motion and pain control to minimize functional deficits and optimize patient for total joint replacement. Rehabilitation Potential For Stated Goals: Good  Regarding Subhash Minor's therapy, I certify that the treatment plan above will be carried out by a therapist or under their direction.   Thank you for this referral,  Louis Pod, PT               HISTORY:   Present Symptoms:  Pain Intensity 1: (10 at worst)  Pain Location 1: Hip, Knee History of Present Injury/Illness (Reason for Referral):  Medical/Referring Diagnosis: Ankylosis, right hip [M24.651]   Past Medical History/Comorbidities:   Mr. Amanda Werner  has a past medical history of Arthritis, Current every day smoker, Diabetes ([facility] [address] Hypertension, and Psychiatric disorder (2019). He also has no past medical history of COPD, Dementia, Endocrine disease, Gastrointestinal disorder, Infectious disease, Neurological disorder, Other ill-defined conditions(799.89), or Sleep disorder. Mr. Amanda Werner  has a past surgical history that includes hx orthopaedic and hx colonoscopy.   Social History/Living Environment:   Home Environment: Private residence  # Steps to Enter: 1  Rails to Enter: No  One/Two Story Residence: One story  Living Alone: Yes  Support Systems: Friends \ neighbors  Patient Expects to be Discharged to[de-identified] Private residence  Current DME Used/Available at Home: Cane, straight  Tub or Shower Type: Tub/Shower combination  Work/Activity:  disabled  Dominant Side:  LEFT  Current Medications:  See Pre-assessment nursing note   Number of Personal Factors/Comorbidities that affect the Plan of Care: 1-2: MODERATE COMPLEXITY   EXAMINATION:   ADLs (Current Functional Status):   Ambulation:  [x] Independent  [] Walk Indoors Only  [] Walk Outdoors  [] Use Assistive Device  [] Use Wheelchair Only Dressing:  [] Independent  Requires Assistance from Someone for:  [x] Sock/Shoes  [] Pants  [] Everything   Bathing/Showering:   [x] Independent  [] Requires Assistance from Someone  [] [address] Only Household Activities:  [x] Routine house and yard work  [] Light Housework Only  [] None   Observation/Orthostatic Postural Assessment:   Exceptions to WDLRounded shoulders, Trunk flexion, Leg length discrepancy, right  ROM/Flexibility:   Gross Assessment: Yes  AROM: Within functional limits(left LE)                       RLE Assessment  RLE Assessment (WDL): Exceptions to WDL(right hip <3/5)  RLE AROM  R Hip Flexion: 80  R Hip ABduction: 10  R Knee Extension: -10   Strength:   Gross Assessment: Yes  Strength: Within functional limits(left LE)                  Functional Mobility:    Gross Assessment: Yes    Gait Description (WDL): Exceptions to WDL  Stand to Sit: Additional time, Independent  Sit to Stand: Independent, Additional time  Distance (ft): 200 Feet (ft)  Ambulation - Level of Assistance: Independent  Speed/Larisa: Delayed  Stance: Right decreased  Gait Abnormalities: Antalgic;Trunk sway increased          Balance:    Sitting: Intact  Standing: Intact   Body Structures Involved:  1. Bones  2. Joints  3. Muscles  4. Ligaments Body Functions Affected:  1. Movement Related Activities and Participation Affected:  1. Mobility   Number of elements that affect the Plan of Care: 3: MODERATE COMPLEXITY   CLINICAL PRESENTATION:   Presentation: Stable and uncomplicated: LOW COMPLEXITY   CLINICAL DECISION MAKING:   Outcome Measure: Tool Used: Lower Extremity Functional Scale (LEFS)  Score:  Initial: 17/80 Most Recent: X/80 (Date: -- )   Interpretation of Score: 20 questions each scored on a 5 point scale with 0 representing \"extreme difficulty or unable to perform\" and 4 representing \"no difficulty\". The lower the score, the greater the functional disability. 80/80 represents no disability. Minimal detectable change is 9 points. Medical Necessity:   · Mr. Eura Hammans is expected to optimize his lower extremity strength and ROM in preparation for joint replacement surgery. Reason for Services/Other Comments:  · Achieve baseline assesment of musculoskeletal system, functional mobility and home environment. , educate in PT HEP in preparation for surgery, educate in hospital plan of care. Use of outcome tool(s) and clinical judgement create a POC that gives a: Clear prediction of patient's progress: LOW COMPLEXITY   TREATMENT:   Treatment/Session Assessment:  Patient was instructed in PT- HEP to increase strength and ROM in LEs. Answered all questions. · Post session pain:  Hip and knee pain  · Compliance with Program/Exercises: compliant most of the time.   Total Treatment Duration:  PT Patient Time In/Time Out  Time In: 1115  [facility]: [facility],

## 2021-01-12 VITALS
RESPIRATION RATE: 16 BRPM | TEMPERATURE: 98.7 F | HEIGHT: 70 IN | WEIGHT: 211 LBS | HEART RATE: 67 BPM | OXYGEN SATURATION: 95 % | DIASTOLIC BLOOD PRESSURE: 99 MMHG | BODY MASS INDEX: 30.21 KG/M2 | SYSTOLIC BLOOD PRESSURE: 178 MMHG

## 2021-01-12 NOTE — PROGRESS NOTES
01/11/21 1030   Oxygen Therapy   O2 Sat (%) 98 %   Pulse via Oximetry 92 beats per minute   O2 Device Room air   Pre-Treatment   Breath Sounds Bilateral Clear   Pt's symptoms include:    Snoring  Tiredness- excessive daytime sleepiness  HTN  GERD  Neck size       40.5       cm  Modified Renteria stage 4  SACS Score 13  STOP BANG 5  [place] Sleepiness scale 7  Height   5   '  10  \"   Weight  211   lbs  BMI 30.28      Sleepiness Scale:     Sitting and reading 1    Watching TV 1    Sitting inactive in a public place 1    As a passenger in a car for an hour without a break 1    Lying down to rest in the afternoon when circumstances Permits 1    Sitting and talking to someone 0    Sitting quietly after lunch without alcohol 2    In a car, while stopped for a few minutes 0    Total :  7    Refer patient for sleep study based on above assessment. Initial respiratory Assessment completed with pt. Pt was interviewed and evaluated in Joint camp prior to surgery. Patient ID:  Maria Del Carmen Carcamo  [id number]  50 y.o.  1965  Surgeon: Dr. Paco Machuca  Date of Surgery: 2/2/2021  Procedure: Total Right Hip Arthroplasty  Primary Care Physician: Nisreen, MD Rahul None  Specialists:     Pt. IS SOMEWHAT PRACTICING SOCIAL DISTANCING AND WEARING A MASK WHEN IN PUBLIC. Pt taught proper COUGH technique  DIAPHRAGMATIC BREATHING EXERCISE INSTRUCTIONS GIVEN    History of smoking:   CURRENT SMOKER-     1.2     PPD for   10         YEARS  Smoking Cessation  \"SMOKING: YOUR PLAN TO QUIT\" handout given to pt. Pt taught to identify when anxiety or stress  is a trigger . Relaxation breathing technique taught to pt.                  Quit date:       PT STATES HE IS TRYING TO QUIT  Secondhand smoke:MOTHER    Past procedures with Oxygen desaturation or delayed awakening:DENIES    Past Medical History:   Diagnosis Date    Arthritis     bilateral knees and right hip    Current every day smoker     trying to quit    Diabetes ([facility] [address]     type 2; no medications; does not check blood sugar    History of priapism     Hypertension     Poor historian     Psychiatric disorder 2019    situational panic attack x 1      PT HAD EPISODE OF WHEEZING 8/25/2019 AND USED MDI AT THAT TIME BUT NOT CURRENTLY  Respiratory history:DENIES SOB                                                                  Respiratory meds:  DENIES    FAMILY PRESENT:             NO                                                   PAST SLEEP STUDY:                      DENIES  HX OF TRACY:                                       DENIES  TRACY assessment:                                               SLEEPS ON SIDE         PHYSICAL EXAM   Body mass index is 30.28 kg/m².    Visit Vitals  BP (!) 178/99 (BP 1 Location: Left arm, BP Patient Position: Sitting)   Pulse 67   Temp 98.7 °F (37.1 °C)   Resp 16   Ht 5' 10\" (1.778 m)   Wt 95.7 kg (211 lb)   SpO2 95%   BMI 30.28 kg/m²     Neck circumference:    40.5  cm    Loud snoring:                                                 YES        Witnessed apnea or wakening gasping or choking:        DENIES      Awakens with headaches:                                               DENIES  Morning or daytime tiredness/ sleepiness:                                TIRED  Dry mouth or sore throat in morning:                   DENIES                                   Renteria stage: 4                                    SACS score:13  Stop Bang   STOP-BANG  Does the patient snore loudly (louder than talking or loud enough to be heard through closed doors)?: Yes  Does the patient often feel tired, fatigued, or sleepy during the daytime, even after a \"good\" night's sleep?: Yes  Has anyone ever observed the patient stop breathing during their sleep? : No  Does the patient have or are they being treated for high blood pressure?: Yes  Is the patient's BMI greater than 35?: No  Is your neck circumference greater than 17 inches (Male) or 16 inches (Female)?: No  Is the patient older than 48?: Yes  Is the patient male?: Yes  TRACY Score: 5  Has the patient been referred to Sleep Medicine?: Yes  Has the patient previously been diagnosed with Obstructive Sleep Apnea?: No                                     [facility]                               Referrals:  [facility]  Pt.  Phone Number:  [phone]

## 2021-01-12 NOTE — PERIOP NOTES
Follow up to chart. UA results - 4+ bacteria, >300 Pro, >100 wbc, nitrite neg. Preliminary culture- no growth. No documentation as yet from NP re: elevated Creat/ pt c/o foul odor of urine. Will post chart for further f/u post NP documentation/orders.

## 2021-01-13 PROBLEM — R79.89 ELEVATED SERUM CREATININE: Status: ACTIVE | Noted: 2021-01-13

## 2021-01-13 PROBLEM — R06.83 SNORING: Status: ACTIVE | Noted: 2021-01-13

## 2021-01-13 NOTE — ADVANCED PRACTICE NURSE
Total Joint Surgery Preoperative Chart Review      Patient ID:  Eliceo Jarrett  [id number]  50 y.o.  1965  Surgeon: Dr. Jeff Salinas  Date of Surgery: 2/2/2021  Procedure: Total Right Hip Arthroplasty  Primary Care Physician: Nisreen, MD Rahul None  Specialty Physician(s):      Subjective:   Eliceo Jarrett is a 50 y.o. BLACK OR  male who presents for preoperative evaluation for Total Right Hip arthroplasty. This is a preoperative chart review note based on data collected by the nurse at the surgical Pre-Assessment visit. Past Medical History:   Diagnosis Date    Arthritis     bilateral knees and right hip    Current every day smoker     trying to quit    Diabetes ([facility] [address]     type 2; no medications; does not check blood sugar    History of priapism     Hypertension     Poor historian     Psychiatric disorder 2019    situational panic attack x 1      Past Surgical History:   Procedure Laterality Date    HX COLONOSCOPY      HX ORTHOPAEDIC      right hip pin     Family History   Problem Relation Age of Onset    Cancer Mother     Lung Disease Mother       Social History     Tobacco Use    Smoking status: Current Every Day Smoker     Packs/day: 0.50     Years: 10.00     Pack years: 5.00    Smokeless tobacco: Never Used   Substance Use Topics    Alcohol use: Yes     Comment: occasionally       Prior to Admission medications    Medication Sig Start Date End Date Taking? Authorizing Provider   HYDROcodone-acetaminophen (Norco) 5-325 mg per tablet Take 1 Tab by mouth every eight (8) hours as needed for Pain. Indications: pain   Yes Provider, Historical   cholecalciferol, vitamin D3, (Vitamin D3) 50 mcg (2,000 unit) tab Take  by mouth daily. Indications: prevention of vitamin D deficiency   Yes Provider, Historical   atorvastatin (LIPITOR) 40 mg tablet Take 40 mg by mouth nightly.  Indications: high cholesterol   Yes Provider, Historical   nicotine (NICODERM CQ) 14 mg/24 hr patch 1 Patch by TransDERmal route every twenty-four (24) hours. Indications: stop smoking   Yes Provider, Historical   lisinopril (PRINIVIL, ZESTRIL) 20 mg tablet Take 20 mg by mouth daily. Indications: high blood pressure   Yes Other, MD Rahul   amLODIPine (NORVASC) 5 mg tablet Take 1 Tab by mouth daily. Patient taking differently: Take 5 mg by mouth daily. Take / use AM day of surgery  per anesthesia protocols. Indications: high blood pressure 11/17/11  Yes Femi Jeffers PA     Allergies   Allergen Reactions    Codeine Rash     Pt denies allergy          Objective:     Physical Exam:   No data found. ECG:    EKG Results     Procedure 720 Value Units Date/Time    EKG, 12 LEAD, INITIAL [[id number]] Collected: 01/11/21 1220    Order Status: Completed Updated: 01/11/21 1425     Ventricular Rate 56 BPM      Atrial Rate 56 BPM      P-R Interval 134 ms      QRS Duration 102 ms      Q-T Interval 444 ms      QTC Calculation (Bezet) 428 ms      Calculated P Axis 35 degrees      Calculated R Axis 44 degrees      Calculated T Axis 45 degrees      Diagnosis --     Sinus bradycardia  Otherwise normal ECG  When compared with ECG of 03-JAN-2012 19:05,  No significant change was found  Confirmed by MARQUISE ALSTON (), [place] ([id number]) on 1/11/2021 2:25:03 PM            Data Review:   Labs:     Labs reviewed  With elevated creatinine 1.6. UA reviewed. No significant culture growth. Problem List:  )  Patient Active Problem List   Diagnosis Code    Elevated serum creatinine R79.89    Snoring R06.83       Total Joint Surgery Pre-Assessment Recommendations:           Patient with elevated BP today but did not take BP medications. UA pending. Patient will need to follow up with PCP in future r/t elevated creatinine. UA reviewed. No significant culture growth. Patient reports the symptoms of snoring, fatigue, observed apnea and /or excessive daytime sleepiness. Will refer patient for HST based on above assessment.    Recommend continuous saturation monitoring hours of sleep, during hospitalization.         Signed By: Lorice Rinne, NP-C    January 13, 2021

## 2021-01-14 LAB
BACTERIA SPEC CULT: NORMAL
BACTERIA SPEC CULT: NORMAL
SERVICE CMNT-IMP: NORMAL

## 2021-01-20 ENCOUNTER — HOSPITAL ENCOUNTER (OUTPATIENT)
Dept: SLEEP MEDICINE | Age: 56
Discharge: HOME | End: 2021-01-20
Payer: MEDICARE

## 2021-01-20 PROCEDURE — 95806 SLEEP STUDY UNATT&RESP EFFT: CPT

## 2021-01-29 NOTE — H&P
[id number] [facility]  History and Physical Exam    Patient ID:  Alondra Garcia  [id number]    85 y.o.  1965    Today: January 29, 2021    Vitals Signs: Reviewed as noted in medical record. Allergies: Allergies   Allergen Reactions    Codeine Rash     Pt denies allergy       CC: Right hip pain    HPI:  Pt complains of right hip pain and difficulty ambulating. Relevant PMH:   Past Medical History:   Diagnosis Date    Arthritis     bilateral knees and right hip    Current every day smoker     trying to quit    Diabetes ([address]     type 2; no medications; does not check blood sugar    History of priapism     Hypertension     Poor historian     Psychiatric disorder 2019    situational panic attack x 1       Objective:                    HEENT: NC/AT                   Lungs:  clear                   Heart:   rrr                   Abdomen: soft                   Extremities:  LROM and pain about right hip joint    Radiographs: reveal osteoarthritis with loss of joint space and bone spurs. Assessment: Arthrofibrosis of right hip joint [M24.651]    Plan:  Proceed with scheduled Procedure(s) (LRB):  RIGHT HIP ARTHROPLASTY TOTAL CONVERSION/ DEPUY (Right) . The patient has failed conservative treatment including NSAIDS, and injections. Due to the amount of pain the patient is experiencing we will proceed with scheduled procedure. It is also felt that the patient is high risk for postoperative complication due to history of multiple chronic medical problems.   The patient may potentially spend 2 nights in the hospital.      Signed By: DAVID Batista  January 29, 2021

## 2021-02-01 ENCOUNTER — ANESTHESIA EVENT (OUTPATIENT)
Dept: SURGERY | Age: 56
DRG: 470 | End: 2021-02-01
Payer: MEDICARE

## 2021-02-02 ENCOUNTER — ANESTHESIA (OUTPATIENT)
Dept: SURGERY | Age: 56
DRG: 470 | End: 2021-02-02
Payer: MEDICARE

## 2021-02-02 ENCOUNTER — HOSPITAL ENCOUNTER (INPATIENT)
Age: 56
LOS: 2 days | Discharge: HOME HEALTH | DRG: 470 | End: 2021-02-04
Attending: ORTHOPAEDIC SURGERY | Admitting: ORTHOPAEDIC SURGERY
Payer: MEDICARE

## 2021-02-02 ENCOUNTER — APPOINTMENT (OUTPATIENT)
Dept: GENERAL RADIOLOGY | Age: 56
DRG: 470 | End: 2021-02-02
Attending: PHYSICIAN ASSISTANT
Payer: MEDICARE

## 2021-02-02 ENCOUNTER — APPOINTMENT (OUTPATIENT)
Dept: GENERAL RADIOLOGY | Age: 56
DRG: 470 | End: 2021-02-02
Attending: ORTHOPAEDIC SURGERY
Payer: MEDICARE

## 2021-02-02 DIAGNOSIS — Z96.641 STATUS POST TOTAL HIP REPLACEMENT, RIGHT: Primary | ICD-10-CM

## 2021-02-02 PROBLEM — M16.11 OSTEOARTHRITIS OF RIGHT HIP: Status: ACTIVE | Noted: 2021-02-02

## 2021-02-02 LAB
ABO + RH BLD: NORMAL
BLOOD GROUP ANTIBODIES SERPL: NORMAL
GLUCOSE BLD STRIP.AUTO-MCNC: 91 MG/DL (ref 65–100)
HGB BLD-MCNC: 10.2 G/DL (ref 13.6–17.2)
SPECIMEN EXP DATE BLD: NORMAL

## 2021-02-02 PROCEDURE — 77030006788 HC BLD SAW OSC STRY -B: Performed by: ORTHOPAEDIC SURGERY

## 2021-02-02 PROCEDURE — 74011250637 HC RX REV CODE- 250/637: Performed by: PHYSICIAN ASSISTANT

## 2021-02-02 PROCEDURE — 0SR904A REPLACEMENT OF RIGHT HIP JOINT WITH CERAMIC ON POLYETHYLENE SYNTHETIC SUBSTITUTE, UNCEMENTED, OPEN APPROACH: ICD-10-PCS | Performed by: ORTHOPAEDIC SURGERY

## 2021-02-02 PROCEDURE — 74011250637 HC RX REV CODE- 250/637: Performed by: ORTHOPAEDIC SURGERY

## 2021-02-02 PROCEDURE — 74011000250 HC RX REV CODE- 250: Performed by: REGISTERED NURSE

## 2021-02-02 PROCEDURE — 82962 GLUCOSE BLOOD TEST: CPT

## 2021-02-02 PROCEDURE — 74011000258 HC RX REV CODE- 258: Performed by: ORTHOPAEDIC SURGERY

## 2021-02-02 PROCEDURE — 77030006790 HC BLD SAW OSC ZIMM -B: Performed by: ORTHOPAEDIC SURGERY

## 2021-02-02 PROCEDURE — 74011250636 HC RX REV CODE- 250/636: Performed by: PHYSICIAN ASSISTANT

## 2021-02-02 PROCEDURE — 72170 X-RAY EXAM OF PELVIS: CPT

## 2021-02-02 PROCEDURE — 77030016544 HC BLD SAW RECIP1 STRY -B: Performed by: ORTHOPAEDIC SURGERY

## 2021-02-02 PROCEDURE — 77030025006 HC BUR STRY -C: Performed by: ORTHOPAEDIC SURGERY

## 2021-02-02 PROCEDURE — 76010000172 HC OR TIME 2.5 TO 3 HR INTENSV-TIER 1: Performed by: ORTHOPAEDIC SURGERY

## 2021-02-02 PROCEDURE — 74011250636 HC RX REV CODE- 250/636: Performed by: ORTHOPAEDIC SURGERY

## 2021-02-02 PROCEDURE — 0SP904Z REMOVAL OF INTERNAL FIXATION DEVICE FROM RIGHT HIP JOINT, OPEN APPROACH: ICD-10-PCS | Performed by: ORTHOPAEDIC SURGERY

## 2021-02-02 PROCEDURE — 77030018547 HC SUT ETHBND1 J&J -B: Performed by: ORTHOPAEDIC SURGERY

## 2021-02-02 PROCEDURE — C1776 JOINT DEVICE (IMPLANTABLE): HCPCS | Performed by: ORTHOPAEDIC SURGERY

## 2021-02-02 PROCEDURE — 74011000250 HC RX REV CODE- 250: Performed by: ANESTHESIOLOGY

## 2021-02-02 PROCEDURE — 74011250636 HC RX REV CODE- 250/636: Performed by: REGISTERED NURSE

## 2021-02-02 PROCEDURE — 77030004413 HC BUR OVL STRY -B: Performed by: ORTHOPAEDIC SURGERY

## 2021-02-02 PROCEDURE — 73501 X-RAY EXAM HIP UNI 1 VIEW: CPT

## 2021-02-02 PROCEDURE — 77030040361 HC SLV COMPR DVT MDII -B

## 2021-02-02 PROCEDURE — 77030040830 HC CATH URETH FOL MDII -A: Performed by: ORTHOPAEDIC SURGERY

## 2021-02-02 PROCEDURE — 74011000250 HC RX REV CODE- 250: Performed by: PHYSICIAN ASSISTANT

## 2021-02-02 PROCEDURE — 77030002982 HC SUT POLYSRB J&J -A: Performed by: ORTHOPAEDIC SURGERY

## 2021-02-02 PROCEDURE — 86901 BLOOD TYPING SEROLOGIC RH(D): CPT

## 2021-02-02 PROCEDURE — 77030034479 HC ADH SKN CLSR PRINEO J&J -B: Performed by: ORTHOPAEDIC SURGERY

## 2021-02-02 PROCEDURE — 77030002966 HC SUT PDS J&J -A: Performed by: ORTHOPAEDIC SURGERY

## 2021-02-02 PROCEDURE — 85018 HEMOGLOBIN: CPT

## 2021-02-02 PROCEDURE — 74011250636 HC RX REV CODE- 250/636: Performed by: ANESTHESIOLOGY

## 2021-02-02 PROCEDURE — 74011250637 HC RX REV CODE- 250/637: Performed by: ANESTHESIOLOGY

## 2021-02-02 PROCEDURE — 94760 N-INVAS EAR/PLS OXIMETRY 1: CPT

## 2021-02-02 PROCEDURE — 2709999900 HC NON-CHARGEABLE SUPPLY: Performed by: ORTHOPAEDIC SURGERY

## 2021-02-02 PROCEDURE — 77030029883 HC RETRV SUT ARTHSCP HOFFE BEAT -B: Performed by: ORTHOPAEDIC SURGERY

## 2021-02-02 PROCEDURE — C1713 ANCHOR/SCREW BN/BN,TIS/BN: HCPCS | Performed by: ORTHOPAEDIC SURGERY

## 2021-02-02 PROCEDURE — 77030007880 HC KT SPN EPDRL BBMI -B: Performed by: ANESTHESIOLOGY

## 2021-02-02 PROCEDURE — 74011000250 HC RX REV CODE- 250: Performed by: ORTHOPAEDIC SURGERY

## 2021-02-02 PROCEDURE — 76060000036 HC ANESTHESIA 2.5 TO 3 HR: Performed by: ORTHOPAEDIC SURGERY

## 2021-02-02 PROCEDURE — 76210000006 HC OR PH I REC 0.5 TO 1 HR: Performed by: ORTHOPAEDIC SURGERY

## 2021-02-02 PROCEDURE — 2709999900 HC NON-CHARGEABLE SUPPLY

## 2021-02-02 PROCEDURE — 36415 COLL VENOUS BLD VENIPUNCTURE: CPT

## 2021-02-02 PROCEDURE — 77030002933 HC SUT MCRYL J&J -A: Performed by: ORTHOPAEDIC SURGERY

## 2021-02-02 PROCEDURE — 65270000029 HC RM PRIVATE

## 2021-02-02 PROCEDURE — 94762 N-INVAS EAR/PLS OXIMTRY CONT: CPT

## 2021-02-02 PROCEDURE — 77030003665 HC NDL SPN BBMI -A: Performed by: ANESTHESIOLOGY

## 2021-02-02 DEVICE — LINER- CEMENTLESS
Type: IMPLANTABLE DEVICE | Site: HIP | Status: FUNCTIONAL
Brand: MDM

## 2021-02-02 DEVICE — X3 INSERT FOR ADM/ MDM
Type: IMPLANTABLE DEVICE | Site: HIP | Status: FUNCTIONAL
Brand: X3

## 2021-02-02 DEVICE — HEMISPHERICAL SHELL
Type: IMPLANTABLE DEVICE | Site: HIP | Status: FUNCTIONAL
Brand: TRIDENT

## 2021-02-02 DEVICE — BIOLOX DELTA CERAMIC FEMORAL HEAD 28MM DIA +8.5 12/14 TAPER
Type: IMPLANTABLE DEVICE | Site: HIP | Status: FUNCTIONAL
Brand: BIOLOX DELTA

## 2021-02-02 DEVICE — PLATE SCREW
Type: IMPLANTABLE DEVICE | Site: HIP | Status: FUNCTIONAL
Brand: RESTORATION

## 2021-02-02 DEVICE — ACTIS DUOFIX HIP PROSTHESIS (FEMORAL STEM 12/14 TAPER CEMENTLESS SIZE 7 HIGH COLLAR)  CE
Type: IMPLANTABLE DEVICE | Site: HIP | Status: FUNCTIONAL
Brand: ACTIS

## 2021-02-02 RX ORDER — ROPIVACAINE HYDROCHLORIDE 2 MG/ML
INJECTION, SOLUTION EPIDURAL; INFILTRATION; PERINEURAL AS NEEDED
Status: DISCONTINUED | OUTPATIENT
Start: 2021-02-02 | End: 2021-02-02 | Stop reason: HOSPADM

## 2021-02-02 RX ORDER — IBUPROFEN 200 MG
1 TABLET ORAL EVERY 24 HOURS
Status: DISCONTINUED | OUTPATIENT
Start: 2021-02-02 | End: 2021-02-04 | Stop reason: HOSPADM

## 2021-02-02 RX ORDER — CEFAZOLIN SODIUM/WATER 2 G/20 ML
2 SYRINGE (ML) INTRAVENOUS ONCE
Status: DISCONTINUED | OUTPATIENT
Start: 2021-02-02 | End: 2021-02-02

## 2021-02-02 RX ORDER — SODIUM CHLORIDE, SODIUM LACTATE, POTASSIUM CHLORIDE, CALCIUM CHLORIDE 600; 310; 30; 20 MG/100ML; MG/100ML; MG/100ML; MG/100ML
50 INJECTION, SOLUTION INTRAVENOUS CONTINUOUS
Status: DISCONTINUED | OUTPATIENT
Start: 2021-02-02 | End: 2021-02-02 | Stop reason: HOSPADM

## 2021-02-02 RX ORDER — AMLODIPINE BESYLATE 5 MG/1
5 TABLET ORAL
Status: COMPLETED | OUTPATIENT
Start: 2021-02-02 | End: 2021-02-02

## 2021-02-02 RX ORDER — DEXAMETHASONE SODIUM PHOSPHATE 100 MG/10ML
10 INJECTION INTRAMUSCULAR; INTRAVENOUS ONCE
Status: COMPLETED | OUTPATIENT
Start: 2021-02-03 | End: 2021-02-03

## 2021-02-02 RX ORDER — CEFAZOLIN SODIUM/WATER 2 G/20 ML
2 SYRINGE (ML) INTRAVENOUS EVERY 8 HOURS
Status: COMPLETED | OUTPATIENT
Start: 2021-02-02 | End: 2021-02-04

## 2021-02-02 RX ORDER — ACETAMINOPHEN 500 MG
1000 TABLET ORAL EVERY 6 HOURS
Status: DISCONTINUED | OUTPATIENT
Start: 2021-02-02 | End: 2021-02-04 | Stop reason: HOSPADM

## 2021-02-02 RX ORDER — LIDOCAINE HYDROCHLORIDE 10 MG/ML
0.1 INJECTION INFILTRATION; PERINEURAL AS NEEDED
Status: DISCONTINUED | OUTPATIENT
Start: 2021-02-02 | End: 2021-02-02 | Stop reason: HOSPADM

## 2021-02-02 RX ORDER — ACETAMINOPHEN 325 MG/1
975 TABLET ORAL ONCE
Status: DISCONTINUED | OUTPATIENT
Start: 2021-02-02 | End: 2021-02-02 | Stop reason: SDUPTHER

## 2021-02-02 RX ORDER — ASPIRIN 81 MG/1
81 TABLET ORAL EVERY 12 HOURS
Status: DISCONTINUED | OUTPATIENT
Start: 2021-02-02 | End: 2021-02-04 | Stop reason: HOSPADM

## 2021-02-02 RX ORDER — PROPOFOL 10 MG/ML
INJECTION, EMULSION INTRAVENOUS AS NEEDED
Status: DISCONTINUED | OUTPATIENT
Start: 2021-02-02 | End: 2021-02-02 | Stop reason: HOSPADM

## 2021-02-02 RX ORDER — NALOXONE HYDROCHLORIDE 0.4 MG/ML
.2-.4 INJECTION, SOLUTION INTRAMUSCULAR; INTRAVENOUS; SUBCUTANEOUS
Status: DISCONTINUED | OUTPATIENT
Start: 2021-02-02 | End: 2021-02-04 | Stop reason: HOSPADM

## 2021-02-02 RX ORDER — SODIUM CHLORIDE 0.9 % (FLUSH) 0.9 %
5-40 SYRINGE (ML) INJECTION EVERY 8 HOURS
Status: DISCONTINUED | OUTPATIENT
Start: 2021-02-02 | End: 2021-02-04 | Stop reason: HOSPADM

## 2021-02-02 RX ORDER — FENTANYL CITRATE 50 UG/ML
100 INJECTION, SOLUTION INTRAMUSCULAR; INTRAVENOUS ONCE
Status: DISCONTINUED | OUTPATIENT
Start: 2021-02-02 | End: 2021-02-02 | Stop reason: HOSPADM

## 2021-02-02 RX ORDER — AMOXICILLIN 250 MG
2 CAPSULE ORAL DAILY
Status: DISCONTINUED | OUTPATIENT
Start: 2021-02-03 | End: 2021-02-04 | Stop reason: HOSPADM

## 2021-02-02 RX ORDER — OXYCODONE HYDROCHLORIDE 5 MG/1
5 TABLET ORAL
Status: DISCONTINUED | OUTPATIENT
Start: 2021-02-02 | End: 2021-02-02 | Stop reason: HOSPADM

## 2021-02-02 RX ORDER — TETRACAINE HCL 10 MG/ML
INJECTION SUBARACHNOID
Status: COMPLETED | OUTPATIENT
Start: 2021-02-02 | End: 2021-02-02

## 2021-02-02 RX ORDER — TRANEXAMIC ACID 100 MG/ML
INJECTION, SOLUTION INTRAVENOUS AS NEEDED
Status: DISCONTINUED | OUTPATIENT
Start: 2021-02-02 | End: 2021-02-02 | Stop reason: HOSPADM

## 2021-02-02 RX ORDER — ONDANSETRON 4 MG/1
4 TABLET, ORALLY DISINTEGRATING ORAL
Status: DISCONTINUED | OUTPATIENT
Start: 2021-02-02 | End: 2021-02-04 | Stop reason: HOSPADM

## 2021-02-02 RX ORDER — LISINOPRIL 20 MG/1
20 TABLET ORAL DAILY
Status: DISCONTINUED | OUTPATIENT
Start: 2021-02-03 | End: 2021-02-04 | Stop reason: HOSPADM

## 2021-02-02 RX ORDER — HYDROMORPHONE HYDROCHLORIDE 2 MG/ML
0.5 INJECTION, SOLUTION INTRAMUSCULAR; INTRAVENOUS; SUBCUTANEOUS
Status: DISCONTINUED | OUTPATIENT
Start: 2021-02-02 | End: 2021-02-02 | Stop reason: HOSPADM

## 2021-02-02 RX ORDER — HYDROMORPHONE HYDROCHLORIDE 1 MG/ML
1 INJECTION, SOLUTION INTRAMUSCULAR; INTRAVENOUS; SUBCUTANEOUS
Status: DISCONTINUED | OUTPATIENT
Start: 2021-02-02 | End: 2021-02-04 | Stop reason: HOSPADM

## 2021-02-02 RX ORDER — BUPIVACAINE HYDROCHLORIDE 7.5 MG/ML
INJECTION, SOLUTION INTRASPINAL
Status: COMPLETED | OUTPATIENT
Start: 2021-02-02 | End: 2021-02-02

## 2021-02-02 RX ORDER — ACETAMINOPHEN 650 MG/1
650 SUPPOSITORY RECTAL ONCE
Status: DISCONTINUED | OUTPATIENT
Start: 2021-02-02 | End: 2021-02-02 | Stop reason: SDUPTHER

## 2021-02-02 RX ORDER — AMLODIPINE BESYLATE 5 MG/1
5 TABLET ORAL DAILY
Status: DISCONTINUED | OUTPATIENT
Start: 2021-02-03 | End: 2021-02-04 | Stop reason: HOSPADM

## 2021-02-02 RX ORDER — OXYCODONE HYDROCHLORIDE 5 MG/1
10 TABLET ORAL
Status: DISCONTINUED | OUTPATIENT
Start: 2021-02-02 | End: 2021-02-03

## 2021-02-02 RX ORDER — SODIUM CHLORIDE 0.9 % (FLUSH) 0.9 %
5-40 SYRINGE (ML) INJECTION AS NEEDED
Status: DISCONTINUED | OUTPATIENT
Start: 2021-02-02 | End: 2021-02-04 | Stop reason: HOSPADM

## 2021-02-02 RX ORDER — DIPHENHYDRAMINE HCL 25 MG
25 CAPSULE ORAL
Status: DISCONTINUED | OUTPATIENT
Start: 2021-02-02 | End: 2021-02-04 | Stop reason: HOSPADM

## 2021-02-02 RX ORDER — NORETHINDRONE AND ETHINYL ESTRADIOL 0.5-0.035
25 KIT ORAL ONCE
Status: COMPLETED | OUTPATIENT
Start: 2021-02-02 | End: 2021-02-02

## 2021-02-02 RX ORDER — PROPOFOL 10 MG/ML
INJECTION, EMULSION INTRAVENOUS
Status: DISCONTINUED | OUTPATIENT
Start: 2021-02-02 | End: 2021-02-02 | Stop reason: HOSPADM

## 2021-02-02 RX ORDER — VANCOMYCIN HYDROCHLORIDE 1 G/20ML
INJECTION, POWDER, LYOPHILIZED, FOR SOLUTION INTRAVENOUS AS NEEDED
Status: DISCONTINUED | OUTPATIENT
Start: 2021-02-02 | End: 2021-02-02 | Stop reason: HOSPADM

## 2021-02-02 RX ORDER — MIDAZOLAM HYDROCHLORIDE 1 MG/ML
2 INJECTION, SOLUTION INTRAMUSCULAR; INTRAVENOUS ONCE
Status: DISCONTINUED | OUTPATIENT
Start: 2021-02-02 | End: 2021-02-02 | Stop reason: HOSPADM

## 2021-02-02 RX ORDER — ALBUTEROL SULFATE 0.83 MG/ML
2.5 SOLUTION RESPIRATORY (INHALATION) AS NEEDED
Status: DISCONTINUED | OUTPATIENT
Start: 2021-02-02 | End: 2021-02-02 | Stop reason: HOSPADM

## 2021-02-02 RX ORDER — SODIUM CHLORIDE 9 MG/ML
100 INJECTION, SOLUTION INTRAVENOUS CONTINUOUS
Status: DISPENSED | OUTPATIENT
Start: 2021-02-02 | End: 2021-02-04

## 2021-02-02 RX ORDER — LIDOCAINE HYDROCHLORIDE 20 MG/ML
INJECTION, SOLUTION EPIDURAL; INFILTRATION; INTRACAUDAL; PERINEURAL AS NEEDED
Status: DISCONTINUED | OUTPATIENT
Start: 2021-02-02 | End: 2021-02-02 | Stop reason: HOSPADM

## 2021-02-02 RX ORDER — ACETAMINOPHEN 500 MG
1000 TABLET ORAL ONCE
Status: COMPLETED | OUTPATIENT
Start: 2021-02-02 | End: 2021-02-02

## 2021-02-02 RX ORDER — SODIUM CHLORIDE, SODIUM LACTATE, POTASSIUM CHLORIDE, CALCIUM CHLORIDE 600; 310; 30; 20 MG/100ML; MG/100ML; MG/100ML; MG/100ML
75 INJECTION, SOLUTION INTRAVENOUS CONTINUOUS
Status: DISCONTINUED | OUTPATIENT
Start: 2021-02-02 | End: 2021-02-02 | Stop reason: HOSPADM

## 2021-02-02 RX ORDER — CEFAZOLIN SODIUM/WATER 2 G/20 ML
2 SYRINGE (ML) INTRAVENOUS ONCE
Status: COMPLETED | OUTPATIENT
Start: 2021-02-02 | End: 2021-02-02

## 2021-02-02 RX ORDER — MIDAZOLAM HYDROCHLORIDE 1 MG/ML
2 INJECTION, SOLUTION INTRAMUSCULAR; INTRAVENOUS
Status: DISCONTINUED | OUTPATIENT
Start: 2021-02-02 | End: 2021-02-02 | Stop reason: HOSPADM

## 2021-02-02 RX ADMIN — SODIUM CHLORIDE, SODIUM LACTATE, POTASSIUM CHLORIDE, AND CALCIUM CHLORIDE: 600; 310; 30; 20 INJECTION, SOLUTION INTRAVENOUS at 11:37

## 2021-02-02 RX ADMIN — SODIUM CHLORIDE, SODIUM LACTATE, POTASSIUM CHLORIDE, AND CALCIUM CHLORIDE 75 ML/HR: 600; 310; 30; 20 INJECTION, SOLUTION INTRAVENOUS at 09:00

## 2021-02-02 RX ADMIN — EPHEDRINE SULFATE 25 MG: 50 INJECTION, SOLUTION INTRAVENOUS at 13:00

## 2021-02-02 RX ADMIN — OXYCODONE HYDROCHLORIDE 10 MG: 5 TABLET ORAL at 18:12

## 2021-02-02 RX ADMIN — CEFAZOLIN SODIUM 2 G: 100 INJECTION, POWDER, LYOPHILIZED, FOR SOLUTION INTRAVENOUS at 17:36

## 2021-02-02 RX ADMIN — ONDANSETRON 4 MG: 4 TABLET, ORALLY DISINTEGRATING ORAL at 16:34

## 2021-02-02 RX ADMIN — PHENYLEPHRINE HYDROCHLORIDE 100 MCG: 10 INJECTION INTRAVENOUS at 10:40

## 2021-02-02 RX ADMIN — SODIUM CHLORIDE 5 MCG/MIN: 900 INJECTION, SOLUTION INTRAVENOUS at 10:56

## 2021-02-02 RX ADMIN — Medication 3 AMPULE: at 08:28

## 2021-02-02 RX ADMIN — TETRACAINE HCL 12 MG: 10 INJECTION SUBARACHNOID at 09:53

## 2021-02-02 RX ADMIN — HYDROMORPHONE HYDROCHLORIDE 0.5 MG: 2 INJECTION INTRAMUSCULAR; INTRAVENOUS; SUBCUTANEOUS at 12:42

## 2021-02-02 RX ADMIN — Medication 81 MG: at 22:15

## 2021-02-02 RX ADMIN — PHENYLEPHRINE HYDROCHLORIDE 100 MCG: 10 INJECTION INTRAVENOUS at 10:45

## 2021-02-02 RX ADMIN — Medication 1 AMPULE: at 22:15

## 2021-02-02 RX ADMIN — ACETAMINOPHEN 1000 MG: 500 TABLET, FILM COATED ORAL at 08:28

## 2021-02-02 RX ADMIN — ACETAMINOPHEN 1000 MG: 500 TABLET, FILM COATED ORAL at 17:36

## 2021-02-02 RX ADMIN — AMLODIPINE BESYLATE 5 MG: 5 TABLET ORAL at 09:05

## 2021-02-02 RX ADMIN — PHENYLEPHRINE HYDROCHLORIDE 200 MCG: 10 INJECTION INTRAVENOUS at 10:48

## 2021-02-02 RX ADMIN — TRANEXAMIC ACID 1000 MG: 100 INJECTION, SOLUTION INTRAVENOUS at 10:07

## 2021-02-02 RX ADMIN — LIDOCAINE HYDROCHLORIDE 50 MG: 20 INJECTION, SOLUTION EPIDURAL; INFILTRATION; INTRACAUDAL; PERINEURAL at 09:54

## 2021-02-02 RX ADMIN — BUPIVACAINE HYDROCHLORIDE IN DEXTROSE 6 MG: 7.5 INJECTION, SOLUTION SUBARACHNOID at 09:53

## 2021-02-02 RX ADMIN — PHENYLEPHRINE HYDROCHLORIDE 100 MCG: 10 INJECTION INTRAVENOUS at 10:32

## 2021-02-02 RX ADMIN — HYDROMORPHONE HYDROCHLORIDE 0.5 MG: 2 INJECTION INTRAMUSCULAR; INTRAVENOUS; SUBCUTANEOUS at 12:50

## 2021-02-02 RX ADMIN — OXYCODONE HYDROCHLORIDE 10 MG: 5 TABLET ORAL at 14:07

## 2021-02-02 RX ADMIN — OXYCODONE HYDROCHLORIDE 10 MG: 5 TABLET ORAL at 22:16

## 2021-02-02 RX ADMIN — PHENYLEPHRINE HYDROCHLORIDE 200 MCG: 10 INJECTION INTRAVENOUS at 10:56

## 2021-02-02 RX ADMIN — PROPOFOL 140 MCG/KG/MIN: 10 INJECTION, EMULSION INTRAVENOUS at 09:54

## 2021-02-02 RX ADMIN — PROPOFOL 50 MG: 10 INJECTION, EMULSION INTRAVENOUS at 09:54

## 2021-02-02 RX ADMIN — Medication 2 G: at 09:56

## 2021-02-02 NOTE — PERIOP NOTES
TRANSFER - OUT REPORT:    Verbal report given to [facility] RN on Froy Wang  being transferred to [address] [place] [address] for routine post - op       Report consisted of patients Situation, Background, Assessment and   Recommendations(SBAR). Information from the following report(s) SBAR, OR Summary, Intake/Output, MAR and Cardiac Rhythm sinus rylee was reviewed with the receiving nurse. Lines:   Peripheral IV 02/02/21 Left Arm (Active)   Site Assessment Clean, dry, & intact 02/02/21 1310   Phlebitis Assessment 0 02/02/21 1310   Infiltration Assessment 0 02/02/21 1310   Dressing Status Clean, dry, & intact 02/02/21 1310   Dressing Type Transparent;Tape 02/02/21 1310   Hub Color/Line Status Pink; Infusing;Patent 02/02/21 1310        Opportunity for questions and clarification was provided. Patient transported with:   Tech    VTE prophylaxis orders have been written for [employer]. Patient and family given floor number and nurses name. Family updated re: pt status after security code verified.

## 2021-02-02 NOTE — PROGRESS NOTES
Care Management Interventions  PCP Verified by CM: Yes  Mode of Transport at Discharge: Self  Transition of Care Consult (CM Consult): [address]: Yes  Discharge Durable Medical Equipment: Yes  Physical Therapy Consult: Yes  Occupational Therapy Consult: Yes  Current Support Network: Own Home  Confirm Follow Up Transport: Friends  The Plan for Transition of Care is Related to the Following Treatment Goals : return to independent function  The Patient and/or Patient Representative was Provided with a Choice of Provider and Agrees with the Discharge Plan?: Yes  Freedom of Choice List was Provided with Basic Dialogue that Supports the Patient's Individualized Plan of Care/Goals, Treatment Preferences and Shares the Quality Data Associated with the Providers?: Yes  Discharge Location  Discharge Placement: Home with home health    Patient is a 50y.o. year old male admitted for Right ENRRIQUE . Patient plans to return home on discharge. Order received to arrange home health. Patient without preference towards agency. Referral sent to [facility]. Patient requesting we arrange a walker and bedside commode. Referral sent to [address] [place] [address]. delivered to the hospital room prior to discharge. Will follow until discharge.

## 2021-02-02 NOTE — PROGRESS NOTES
Patient assessment complete as charted, patient oriented to room, call light, how to order meals, fall precautions and pain medication. Patient given 10 mg Oxycodone for pain 8/10 to right hip, Ice bag applied to hip, patient resting comfortable, no needs.

## 2021-02-02 NOTE — PERIOP NOTES
TRANSFER - OUT REPORT:    Verbal report given to KATHY marie on [employer]  being transferred to [facility] for routine progression of care       Report consisted of patients Situation, Background, Assessment and   Recommendations(SBAR). Information from the following report(s) Kardex, MAR and Recent Results was reviewed with the receiving nurse. Lines:   Peripheral IV 02/02/21 Left Arm (Active)        Opportunity for questions and clarification was provided. Patient transported with:   Tech   Norvasc given. Patient did not take as instructed.

## 2021-02-02 NOTE — PROGRESS NOTES
TRANSFER - IN REPORT:    Verbal report received from A lino Tucker(name) on Alondra Garcia  being received from [facility]) for routine progression of care      Report consisted of patients Situation, Background, Assessment and   Recommendations(SBAR). Information from the following report(s) SBAR, Intake/Output, MAR and Recent Results was reviewed with the receiving nurse. Opportunity for questions and clarification was provided. Assessment completed upon patients arrival to unit and care assumed.

## 2021-02-02 NOTE — PROGRESS NOTES
02/02/21 1442   Oxygen Therapy   O2 Sat (%) 99 %   Pulse via Oximetry 53 beats per minute   O2 Device Room air   Incentive Spirometry Treatment   Actual Volume (ml) 1750 ml   Number of Attempts 1   Joint Camp Notes Reviewed. Pt working on IS. Pt encouraged to do 10 breaths per hour while awake on IS. Good NPC. No respiratory distress noted at this time. No complications noted at this time.

## 2021-02-02 NOTE — PROGRESS NOTES
TRANSFER - IN REPORT:    Verbal report received from [facility] (name) on Maryjane Gama  being received from [address] [place] [address] (unit) for ordered procedure      Report consisted of patients Situation, Background, Assessment and   Recommendations(SBAR). Information from the following report(s) SBAR, Kardex, MAR and Accordion was reviewed with the receiving nurse. Opportunity for questions and clarification was provided. Assessment completed upon patients arrival to unit and care assumed.

## 2021-02-02 NOTE — ANESTHESIA POSTPROCEDURE EVALUATION
Procedure(s):  RIGHT HIP ARTHROPLASTY TOTAL CONVERSION/ DEPUY.     spinal    Anesthesia Post Evaluation      Multimodal analgesia: multimodal analgesia used between 6 hours prior to anesthesia start to PACU discharge  Patient location during evaluation: PACU  Patient participation: complete - patient participated  Level of consciousness: awake  Pain management: adequate  Airway patency: patent  Anesthetic complications: no  Cardiovascular status: acceptable and bradycardic  Respiratory status: acceptable  Hydration status: acceptable  Post anesthesia nausea and vomiting:  none      INITIAL Post-op Vital signs:   Vitals Value Taken Time   /84 02/02/21 1325   Temp 36.6 °C (97.8 °F) 02/02/21 1235   Pulse 49 02/02/21 1325   Resp 16 02/02/21 1325   SpO2 98 % 02/02/21 1315

## 2021-02-02 NOTE — ANESTHESIA PREPROCEDURE EVALUATION
Relevant Problems   No relevant active problems       Anesthetic History   No history of anesthetic complications            Review of Systems / Medical History  Patient summary reviewed, nursing notes reviewed and pertinent labs reviewed    Pulmonary          Smoker         Neuro/Psych              Cardiovascular    Hypertension: poorly controlled              Exercise tolerance: >4 METS  Comments: TTE 2019- EF 60-65%, no signif valve abnl  Perf scan 2019- EF 62%, normal perfusion   GI/Hepatic/Renal         Renal disease: CRI       Endo/Other    Diabetes: type 2    Obesity and arthritis     Other Findings            Physical Exam    Airway  Mallampati: I  TM Distance: 4 - 6 cm  Neck ROM: normal range of motion   Mouth opening: Normal     Cardiovascular    Rhythm: regular  Rate: normal         Dental    Dentition: Full upper dentures  Comments:  Many lower teeth missing- of those remaining, none loose per patient   Pulmonary  Breath sounds clear to auscultation               Abdominal         Other Findings            Anesthetic Plan    ASA: 3  Anesthesia type: spinal          Induction: Intravenous  Anesthetic plan and risks discussed with: Patient

## 2021-02-02 NOTE — PERIOP NOTES
Kiara Spaulding here with patient but he talking of leaving and coming back later. His contact# [phone].

## 2021-02-02 NOTE — PERIOP NOTES
Betadine lavage:  17.5cc of betadine lot #[id number]  , exp. Date 02/22  in 500cc of . 9NS Lot #[id number]  , exp.  Date : 05/01/2023

## 2021-02-02 NOTE — ANESTHESIA PROCEDURE NOTES
Spinal Block    Start time: 2/2/2021 9:48 AM  End time: 2/2/2021 9:53 AM  Performed by: Rolando Golden MD  Authorized by: Rolando Golden MD     Pre-procedure:   Indications: primary anesthetic  Preanesthetic Checklist: patient identified, risks and benefits discussed, anesthesia consent, patient being monitored and timeout performed    Timeout Time: 09:48          Spinal Block:   Patient Position:  Seated  Prep Region:  Lumbar  Prep: chlorhexidine and patient draped      Location:  L3-4  Technique:  Single shot    Local Dose (mL):  3    Needle:   Needle Type:  Pencan  Needle Gauge:  25 G  Attempts:  1      Events: CSF confirmed, no blood with aspiration and no paresthesia        Assessment:  Insertion:  Uncomplicated  Patient tolerance:  Patient tolerated the procedure well with no immediate complications

## 2021-02-02 NOTE — H&P
The patient has painful fused right hip joint. The patient was seen and examined and there are no changes to the patient's orthopedic condition. They have tried conservative treatment for this condition; including antiinflammatories and lifestyle modifications and have failed. The necessity for the joint replacement is still present, and the H&P from the office is still current. The patient will be admitted today forProcedure(s) (LRB):  RIGHT HIP ARTHROPLASTY TOTAL CONVERSION/ DEPUY (Right) .

## 2021-02-02 NOTE — ROUTINE PROCESS
Teach back method used with patient concerning antiseptic wash, TB screening, incentive spirometer( 1500 demonstrated preop), and pain management goals.  Patient and family were provided with home discharge needs list.

## 2021-02-03 LAB — HGB BLD-MCNC: 12.2 G/DL (ref 13.6–17.2)

## 2021-02-03 PROCEDURE — 74011250637 HC RX REV CODE- 250/637: Performed by: PHYSICIAN ASSISTANT

## 2021-02-03 PROCEDURE — 36415 COLL VENOUS BLD VENIPUNCTURE: CPT

## 2021-02-03 PROCEDURE — 74011250636 HC RX REV CODE- 250/636: Performed by: PHYSICIAN ASSISTANT

## 2021-02-03 PROCEDURE — 2709999900 HC NON-CHARGEABLE SUPPLY

## 2021-02-03 PROCEDURE — 97166 OT EVAL MOD COMPLEX 45 MIN: CPT

## 2021-02-03 PROCEDURE — 74011000250 HC RX REV CODE- 250: Performed by: PHYSICIAN ASSISTANT

## 2021-02-03 PROCEDURE — 97110 THERAPEUTIC EXERCISES: CPT

## 2021-02-03 PROCEDURE — 65270000029 HC RM PRIVATE

## 2021-02-03 PROCEDURE — 97116 GAIT TRAINING THERAPY: CPT

## 2021-02-03 PROCEDURE — 97161 PT EVAL LOW COMPLEX 20 MIN: CPT

## 2021-02-03 PROCEDURE — 85018 HEMOGLOBIN: CPT

## 2021-02-03 PROCEDURE — 97535 SELF CARE MNGMENT TRAINING: CPT

## 2021-02-03 RX ORDER — HYDROMORPHONE HYDROCHLORIDE 2 MG/1
2 TABLET ORAL
Status: DISCONTINUED | OUTPATIENT
Start: 2021-02-03 | End: 2021-02-04 | Stop reason: HOSPADM

## 2021-02-03 RX ADMIN — Medication 1 AMPULE: at 08:55

## 2021-02-03 RX ADMIN — Medication 1 AMPULE: at 21:34

## 2021-02-03 RX ADMIN — ACETAMINOPHEN 1000 MG: 500 TABLET, FILM COATED ORAL at 18:11

## 2021-02-03 RX ADMIN — ACETAMINOPHEN 1000 MG: 500 TABLET, FILM COATED ORAL at 08:55

## 2021-02-03 RX ADMIN — HYDROMORPHONE HYDROCHLORIDE 2 MG: 2 TABLET ORAL at 12:03

## 2021-02-03 RX ADMIN — ACETAMINOPHEN 1000 MG: 500 TABLET, FILM COATED ORAL at 12:02

## 2021-02-03 RX ADMIN — CEFAZOLIN SODIUM 2 G: 100 INJECTION, POWDER, LYOPHILIZED, FOR SOLUTION INTRAVENOUS at 09:03

## 2021-02-03 RX ADMIN — HYDROMORPHONE HYDROCHLORIDE 1 MG: 1 INJECTION, SOLUTION INTRAMUSCULAR; INTRAVENOUS; SUBCUTANEOUS at 09:02

## 2021-02-03 RX ADMIN — AMLODIPINE BESYLATE 5 MG: 5 TABLET ORAL at 08:56

## 2021-02-03 RX ADMIN — OXYCODONE HYDROCHLORIDE 10 MG: 5 TABLET ORAL at 02:15

## 2021-02-03 RX ADMIN — HYDROMORPHONE HYDROCHLORIDE 2 MG: 2 TABLET ORAL at 18:10

## 2021-02-03 RX ADMIN — DEXAMETHASONE SODIUM PHOSPHATE 10 MG: 10 INJECTION INTRAMUSCULAR; INTRAVENOUS at 17:16

## 2021-02-03 RX ADMIN — Medication 81 MG: at 21:34

## 2021-02-03 RX ADMIN — LISINOPRIL 20 MG: 20 TABLET ORAL at 08:56

## 2021-02-03 RX ADMIN — CEFAZOLIN SODIUM 2 G: 100 INJECTION, POWDER, LYOPHILIZED, FOR SOLUTION INTRAVENOUS at 18:11

## 2021-02-03 RX ADMIN — CEFAZOLIN SODIUM 2 G: 100 INJECTION, POWDER, LYOPHILIZED, FOR SOLUTION INTRAVENOUS at 02:11

## 2021-02-03 RX ADMIN — Medication 81 MG: at 08:55

## 2021-02-03 RX ADMIN — ACETAMINOPHEN 1000 MG: 500 TABLET, FILM COATED ORAL at 02:11

## 2021-02-03 RX ADMIN — Medication 10 ML: at 09:02

## 2021-02-03 RX ADMIN — HYDROMORPHONE HYDROCHLORIDE 2 MG: 2 TABLET ORAL at 21:34

## 2021-02-03 RX ADMIN — DOCUSATE SODIUM 50MG AND SENNOSIDES 8.6MG 2 TABLET: 8.6; 5 TABLET, FILM COATED ORAL at 08:55

## 2021-02-03 NOTE — PROGRESS NOTES
February 3, 2021         Post Op day: 1 Day Post-Op   Admit Diagnosis: Arthrofibrosis of right hip joint [M24.651]  Osteoarthritis of right hip [M16.11]  LAB:    Recent Results (from the past 24 hour(s))   GLUCOSE, POC    Collection Time: 21  8:41 AM   Result Value Ref Range    Glucose (POC) 91 65 - 100 mg/dL   TYPE & SCREEN    Collection Time: 21  8:46 AM   Result Value Ref Range    Crossmatch Expiration 2021,2359     ABO/Rh(D) A POSITIVE     Antibody screen NEG    HEMOGLOBIN    Collection Time: 21  7:09 PM   Result Value Ref Range    HGB 10.2 (L) 13.6 - 17.2 g/dL   HEMOGLOBIN    Collection Time: 21  3:14 AM   Result Value Ref Range    HGB 12.2 (L) 13.6 - 17.2 g/dL     Vital Signs:    Patient Vitals for the past 8 hrs:   BP Temp Pulse Resp SpO2   21 0259 (!) 152/82 98.3 °F (36.8 °C) 62 16 100 %     Temp (24hrs), Av.8 °F (36.6 °C), Min:97 °F (36.1 °C), Max:98.3 °F (36.8 °C)    Pain Control:   Pain Assessment  Pain Scale 1: Numeric (0 - 10)  Pain Intensity 1: 6  Pain Onset 1: at rest  Pain Location 1: Hip  Pain Orientation 1: Right  Pain Description 1: Aching, Constant  Pain Intervention(s) 1: Medication (see MAR)  Subjective:  Resting in bed, reports difficulty with pain control, no other complaints. Objective:  No Acute Distress, Alert and Oriented, right hip dressing is C/D/I. Neurovascular exam is normal       Assessment:   Patient Active Problem List   Diagnosis Code    Elevated serum creatinine R79.89    Snoring R06.83    Osteoarthritis of right hip M16.11       Status Post Procedure(s) (LRB):  RIGHT HIP ARTHROPLASTY TOTAL CONVERSION/ DEPUY (Right)        Plan: Continue Physical Therapy, Monitor Hgb. Will d/c oxycodone and start on PO dilaudid for improved pain control. ASA/SCDs for DVT prophylaxis. D/c to home tomorrow.  Patient seen by Dr. Johnny Berry this AM.    Signed By: DAVID Cameron

## 2021-02-03 NOTE — PROGRESS NOTES
Problem: Mobility Impaired (Adult and Pediatric)  Goal: *Acute Goals and Plan of Care (Insert Text)  Outcome: Progressing Towards Goal  Note: GOALS (1-4 days):  (1.)Mr. Farideh Bautista will move from supine to sit and sit to supine  in bed with STAND BY ASSIST.    (2.)Mr. Farideh Bautista will transfer from bed to chair and chair to bed with STAND BY ASSIST using the least restrictive device. (3.)Mr. Farideh Bautista will ambulate with STAND BY ASSIST for 100 feet with the least restrictive device. (4.)Mr. Farideh Bautista will ambulate up/down 1 steps with MINIMAL ASSIST with device as needed. (5.)Mr. Farideh Bautista will state/observe ENRRIQUE precautions with 0 verbal cues. ________________________________________________________________________________________________       PHYSICAL THERAPY JOINT CAMP ENRRIQUE: Initial Assessment and AM 2/3/2021  INPATIENT: Hospital Day: 2  Payor: Nikolay Carpenter / Plan: [address] / Product Type: [employer] /      NAME/AGE/GENDER: Dana Blum is a 50 y.o. male   PRIMARY DIAGNOSIS:  Arthrofibrosis of right hip joint [M24.651]   Procedure(s) and Anesthesia Type:     * RIGHT HIP ARTHROPLASTY TOTAL CONVERSION/ DEPUY - Spinal (Right)  ICD-10: Treatment Diagnosis:    Pain in Right Hip (M25.551)  Stiffness of Right Hip, Not elsewhere classified (M25.651)  Difficulty in walking, Not elsewhere classified (R26.2)      ASSESSMENT:     Mr. Farideh Bautista presents with decreased functional mobility and gait as well as decreased rom and strength of right LE s/p right enrrique conversion from a fused hip. He plans to go home with HHPT.     This section established at most recent assessment   PROBLEM LIST (Impairments causing functional limitations):  Decreased Strength  Decreased ADL/Functional Activities  Decreased Transfer Abilities  Decreased Ambulation Ability/Technique  Decreased Balance  Increased Pain  Decreased Activity Tolerance  Decreased Flexibility/Joint Mobility  Decreased [place] with Home Exercise Program  Decreased strength   INTERVENTIONS PLANNED: (Benefits and precautions of physical therapy have been discussed with the patient.)  Bed Mobility  Cold  Gait Training  Home Exercise Program (HEP)  Range of Motion (ROM)  Therapeutic Activites  Therapeutic Exercise/Strengthening  Transfer Training     TREATMENT PLAN: Frequency/Duration: Follow patient BID for duration of hospital stay to address above goals. Rehabilitation Potential For Stated Goals: Good     RECOMMENDED REHABILITATION/EQUIPMENT: (at time of discharge pending progress): Continue Skilled Therapy and Home Health: Physical Therapy. HISTORY:   History of Present Injury/Illness (Reason for Referral):  S/p right michael  Past Medical History/Comorbidities:   Mr. Zachary Teresa  has a past medical history of Arthritis, Current every day smoker, Diabetes ([facility] [address] History of priapism, Hypertension, Poor historian, and Psychiatric disorder (2019). He also has no past medical history of COPD, Dementia, Endocrine disease, Gastrointestinal disorder, Infectious disease, Neurological disorder, Other ill-defined conditions(799.89), or Sleep disorder. Mr. Zachary Teresa  has a past surgical history that includes hx orthopaedic and hx colonoscopy.    Social History/Living Environment:   Home Environment: Private residence  # Steps to Enter: 1  Rails to Enter: No  One/Two Story Residence: One story  Living Alone: Yes  Support Systems: Friends \ neighbors  Patient Expects to be Discharged to[de-identified] Private residence  Current DME Used/Available at Home: Cane, straight  Tub or Shower Type: Tub/Shower combination    Prior Level of Function/Work/Activity:  independent   Number of Personal Factors/Comorbidities that affect the Plan of Care: 1-2: MODERATE COMPLEXITY   EXAMINATION:   Most Recent Physical Functioning:      Gross Assessment  AROM: Within functional limits(left LE)  Strength: Generally decreased, functional(left LE)        RLE AROM  R Hip Flexion: 75  R Hip ABduction: 5  R Knee Extension: -10            Bed Mobility  Supine to Sit: Contact guard assistance  Scooting: Contact guard assistance    Transfers  Sit to Stand: Minimum assistance  Stand to Sit: Minimum assistance  Bed to Chair: Moderate assistance    Balance  Sitting: Impaired; With support  Standing: With support              Weight Bearing Status  Right Side Weight Bearing: As tolerated  Distance (ft): 15 Feet (ft)  Ambulation - Level of Assistance: Minimal assistance  Assistive Device: Walker, rolling  Speed/Larisa: Delayed  Step Length: Left shortened;Right shortened  Stance: Right decreased  Gait Abnormalities: Antalgic;Decreased step clearance; Toe walking; Other(knee flexed)  Interventions: Safety awareness training;Verbal cues     Braces/Orthotics:     Right Hip Cold  Type: Cold/ice packs      Body Structures Involved:  Bones  Joints  Muscles  Ligaments Body Functions Affected: Movement Related Activities and Participation Affected: Mobility   Number of elements that affect the Plan of Care: 3: MODERATE COMPLEXITY   CLINICAL PRESENTATION:   Presentation: Stable and uncomplicated: LOW COMPLEXITY   CLINICAL DECISION MAKING:   [facility] AM-PAC 6 Clicks   Basic Mobility Inpatient Short Form  How much difficulty does the patient currently have. .. Unable A Lot A Little None   1. Turning over in bed (including adjusting bedclothes, sheets and blankets)? [] 1   [x] 2   [] 3   [] 4   2. Sitting down on and standing up from a chair with arms ( e.g., wheelchair, bedside commode, etc.)   [] 1   [x] 2   [] 3   [] 4   3. Moving from lying on back to sitting on the side of the bed? [] 1   [x] 2   [] 3   [] 4   How much help from another person does the patient currently need. .. Total A Lot A Little None   4. Moving to and from a bed to a chair (including a wheelchair)? [] 1   [x] 2   [] 3   [] 4   5. Need to walk in hospital room? [] 1   [] 2   [x] 3   [] 4   6. Climbing 3-5 steps with a railing?    [] 1 [x] 2   [] 3   [] 4   © 2007, Trustees of [address] [place] [address] [address] [id number], under license to [employer]. All rights reserved     Score:  Initial: 15 Most Recent: X (Date: -- )    Interpretation of Tool:  Represents activities that are increasingly more difficult (i.e. Bed mobility, Transfers, Gait). Medical Necessity:     Patient is expected to demonstrate progress in   strength, range of motion, and balance   to   decrease assistance required with exercises and functional mobility  . Reason for Services/Other Comments:  Patient   continues to require present interventions due to patient's inability to perform exercises and functional mobility independently  . Use of outcome tool(s) and clinical judgement create a POC that gives a: Clear prediction of patient's progress: LOW COMPLEXITY            TREATMENT:   (In addition to Assessment/Re-Assessment sessions the following treatments were rendered)     Pre-treatment Symptoms/Complaints:  hip pain  Pain Initial:      Post Session:  did not rate   assessment  Therapeutic Exercise: (10 Minutes):  Exercises per grid below to improve mobility and strength. Required minimal visual, verbal, and manual cues to promote proper body alignment, promote proper body posture, and promote proper body mechanics. Progressed range and repetitions as indicated. Date:  2/3 Date:   Date:     ACTIVITY/EXERCISE AM PM AM PM AM PM   GROUP THERAPY  []  []  []  []  []  []   Ankle Pumps 10a        Quad Sets 10a        Gluteal Sets 10a        Hip ABd/ADduction 10aa        Straight Leg Raises         Knee Slides 10aa        Short Arc Quads         Long Arc Quads         Chair Slides                  B = bilateral; AA = active assistive; A = active; P = passive      Treatment/Session Assessment:     Response to Treatment:  did fine, will be slower progress due to hip history. He wants to keep knee flexed and walk on his right toe.     Education:  [x] Home Exercises  [x] Fall Precautions  [x] Hip Precautions [] D/C Instruction Review  [x] Hip Prosthesis Review  [x] Walker Management/Safety [] Adaptive Equipment as Needed       Interdisciplinary Collaboration:   Registered Nurse    After treatment position/precautions:   Up in chair  Bed/Chair-wheels locked  Bed in low position  Call light within reach    Compliance with Program/Exercises: Will assess as treatment progresses. Recommendations/Intent for next treatment session:  Treatment next visit will focus on increasing Mr. David Nguyen independence with bed mobility, transfers, gait training, strength/ROM exercises, modalities for pain, and patient education.       Total Treatment Duration:  PT Patient Time In/Time Out  Time In: 1045  Time Out: 10 Batool Delatorre, PT

## 2021-02-03 NOTE — PROGRESS NOTES
Problem: Self Care Deficits Care Plan (Adult)  Goal: *Acute Goals and Plan of Care (Insert Text)  Description: GOALS:   DISCHARGE GOALS (in preparation for going home/rehab):  3 days  1. Mr. Mile Alcantara will perform one lower body dressing activity with minimal assistance with adaptive equipment to demonstrate improved functional mobility and safety. 2.  Mr. Mile Alcantara will perform one lower body bathing activity with minimal  assistance with adaptive equipment to demonstrate improved functional mobility and safety. 3.  Mr. Mile Alcantara will perform toileting/toilet transfer with contact guard assistance with adaptive equipment to demonstrate improved functional mobility and safety. 4.  Mr. Mile Alcantara will perform shower transfer with contact guard assistance with adaptive equipment to demonstrate improved functional mobility and safety. 5.  Mr. Mile Alcantara will state ENRRIQUE precautions with two verbal cues to demonstrate improved functional mobility and safety. JOINT CAMP OCCUPATIONAL THERAPY ENRRIQUE: Initial Assessment and Daily Note 2/3/2021  INPATIENT: Hospital Day: 2  Payor: [address] [place] [address] / Plan: [address] / Product Type: [employer] /      NAME/AGE/GENDER: Annette Luis is a 50 y.o. male   PRIMARY DIAGNOSIS:  Arthrofibrosis of right hip joint [M24.651]   Procedure(s) and Anesthesia Type:     * RIGHT HIP ARTHROPLASTY TOTAL CONVERSION/ DEPUY - Spinal (Right)  ICD-10: Treatment Diagnosis:    Pain in Right Hip (M25.551)  Stiffness of Right Hip, Not elsewhere classified (M25.651)      ASSESSMENT:     Mr. Mile Alcantara is s/p Right ENRRIQUE and presents with decreased weight bearing on R LE and decreased independence with functional mobility and activities of daily living as compared to prior level of function and safety. Patient would benefit from skilled Occupational Therapy to maximize independence and safety with self-care task and functional mobility.   Pt would also benefit from education on lower body adaptive equipment and hip precautions post-surgery in preparation for going home. Patient not able to ambulate to shower this date, but made improvements throughout session. Patient had previous hip fusion and has some limited ROM and reduced strength. This section established at most recent assessment   PROBLEM LIST (Impairments causing functional limitations):  Decreased Strength  Decreased ADL/Functional Activities  Decreased Transfer Abilities  Increased Pain  Increased Fatigue  Decreased Flexibility/Joint Mobility  Decreased Knowledge of Precautions   INTERVENTIONS PLANNED: (Benefits and precautions of occupational therapy have been discussed with the patient.)  Activities of daily living training  Adaptive equipment training  Balance training  Clothing management  Donning&doffing training  Theraputic activity     TREATMENT PLAN: Frequency/Duration: Follow patient 5x a week to address above goals. Rehabilitation Potential For Stated Goals: Good     RECOMMENDED REHABILITATION/EQUIPMENT: (at time of discharge pending progress): Continue Skilled Therapy. OCCUPATIONAL PROFILE AND HISTORY:   History of Present Injury/Illness (Reason for Referral): Pt presents this date s/p (Right) ENRRIQUE. Past Medical History/Comorbidities:   Mr. Amari Mosher  has a past medical history of Arthritis, Current every day smoker, Diabetes [facility]), History of priapism, Hypertension, Poor historian, and Psychiatric disorder (2019). He also has no past medical history of COPD, Dementia, Endocrine disease, Gastrointestinal disorder, Infectious disease, Neurological disorder, Other ill-defined conditions(799.89), or Sleep disorder. Mr. Amari Mosher  has a past surgical history that includes hx orthopaedic and hx colonoscopy.   Social History/Living Environment:   Home Environment: Private residence  # Steps to Enter: 1  One/Two Story Residence: One story  Living Alone: Yes  Support Systems: Friends \ neighbors  Patient Expects to be Discharged to[de-identified] Private residence  Prior Level of Function/Work/Activity: Indpeendent prior. Number of Personal Factors/Comorbidities that affect the Plan of Care: Expanded review of therapy/medical records (1-2):  MODERATE COMPLEXITY   ASSESSMENT OF OCCUPATIONAL PERFORMANCE[de-identified]   Most Recent Physical Functioning:   Balance  Sitting: With support; Impaired  Standing: With support                    Coordination  Fine Motor Skills-Upper: Left Intact; Right Intact  Gross Motor Skills-Upper: Left Intact; Right Intact         Mental Status  Neurologic State: Alert  Orientation Level: Oriented X4  Cognition: Appropriate decision making                Basic ADLs (From Assessment) Complex ADLs (From Assessment)   Basic ADL  Feeding: Independent  Oral Facial Hygiene/Grooming: Setup  Bathing: Moderate assistance  Upper Body Dressing: Setup  Lower Body Dressing: Maximum assistance  Toileting: Moderate assistance     Grooming/Bathing/Dressing Activities of Daily Living                       Functional Transfers  Bathroom Mobility: Moderate assistance  Toilet Transfer : Moderate assistance     Bed/Mat Mobility  Supine to Sit: Contact guard assistance  Sit to Stand: Moderate assistance  Stand to Sit: Moderate assistance  Bed to Chair: Moderate assistance  Scooting: Contact guard assistance         Physical Skills Involved:  Range of Motion  Balance  Strength  Activity Tolerance Cognitive Skills Affected (resulting in the inability to perform in a timely and safe manner):  [facility] Psychosocial Skills Affected:  Social Interaction  Emotional Regulation  Self-Awareness   Number of elements that affect the Plan of Care: 3-5:  MODERATE COMPLEXITY   CLINICAL DECISION MAKING:   [employer] AM-PAC 6 Clicks   Daily Activity Inpatient Short Form  How much help from another person does the patient currently need. .. Total A Lot A Little None   1. Putting on and taking off regular lower body clothing?    [] 1   [x] 2   [] 3   [] 4 2.  Bathing (including washing, rinsing, drying)? [] 1   [x] 2   [] 3   [] 4   3. Toileting, which includes using toilet, bedpan or urinal?   [] 1   [x] 2   [] 3   [] 4   4. Putting on and taking off regular upper body clothing? [] 1   [] 2   [x] 3   [] 4   5. Taking care of personal grooming such as brushing teeth? [] 1   [] 2   [] 3   [x] 4   6. Eating meals? [] 1   [] 2   [] 3   [x] 4   © 2007, Trustees of [address] [place] [address] [address] [address], under license to [employer]. All rights reserved     Score:  Initial: 17 Most Recent: X (Date: -- )    Interpretation of Tool:  Represents activities that are increasingly more difficult (i.e. Bed mobility, Transfers, Gait). Medical Necessity:     Skilled intervention continues to be required due to Deficits listed above. Reason for Services/Other Comments:  Patient continues to require skilled intervention due to   New ENRRIQUE  . Use of outcome tool(s) and clinical judgement create a POC that gives a: MODERATE COMPLEXITY            TREATMENT:   (In addition to Assessment/Re-Assessment sessions the following treatments were rendered)     Pre-treatment Symptoms/Complaints:    Pain: Initial:   Pain Intensity 1: 6  Post Session:  6     Self Care: (50): Procedure(s) (per grid) utilized to improve and/or restore self-care/home management as related to dressing, bathing, toileting, and grooming. Required minimal verbal and tactile cueing to facilitate activities of daily living skills. Initial evaluation 10 minutes. Treatment/Session Assessment:     Response to Treatment:  Good, sitting up in recliner.     Education:  [] Home Exercises  [x] Fall Precautions  [x] Hip Precautions [] Going Home Video  [] Knee/Hip Prosthesis Review  [x] Walker Management/Safety [x] Adaptive Equipment as Needed       Interdisciplinary Collaboration:   Physical Therapist  Occupational Therapist  Registered Nurse    After treatment position/precautions:   Up in chair  Bed/Chair-wheels locked  Caregiver at bedside  Call light within reach  RN notified     Compliance with Program/Exercises: Compliant all of the time, Will assess as treatment progresses. Recommendations/Intent for next treatment session:  Treatment next visit will focus on increasing Mr. Noni Elias independence with bed mobility, transfers, self care, functional mobility, modalities for pain, and patient education.       Total Treatment Duration:  OT Patient Time In/Time Out  Time In: 0850  Time Out: 12[address], OT

## 2021-02-03 NOTE — PROGRESS NOTES
Problem: Mobility Impaired (Adult and Pediatric)  Goal: *Acute Goals and Plan of Care (Insert Text)  Outcome: Progressing Towards Goal  Note: GOALS (1-4 days):  (1.)Mr. Mason Fry will move from supine to sit and sit to supine  in bed with STAND BY ASSIST.    (2.)Mr. Mason Fry will transfer from bed to chair and chair to bed with STAND BY ASSIST using the least restrictive device. (3.)Mr. Mason Fry will ambulate with STAND BY ASSIST for 100 feet with the least restrictive device. (4.)Mr. Mason Fry will ambulate up/down 1 steps with MINIMAL ASSIST with device as needed. (5.)Mr. Mason Fry will state/observe ENRRIQUE precautions with 0 verbal cues. ________________________________________________________________________________________________       PHYSICAL THERAPY JOINT CAMP ENRRIQUE: Daily Note and PM 2/3/2021  INPATIENT: Hospital Day: 2  Payor: Aure Bowden / Plan: Sivakumar Moreno / Product Type: [employer] /      NAME/AGE/GENDER: Travis Givens is a 50 y.o. male   PRIMARY DIAGNOSIS:  Arthrofibrosis of right hip joint [M24.651]   Procedure(s) and Anesthesia Type:     * RIGHT HIP ARTHROPLASTY TOTAL CONVERSION/ DEPUY - Spinal (Right)  ICD-10: Treatment Diagnosis:    · Pain in Right Hip (M25.551)  · Stiffness of Right Hip, Not elsewhere classified (M25.651)  · Difficulty in walking, Not elsewhere classified (R26.2)      ASSESSMENT:     Mr. Mason Fry presents with decreased functional mobility and gait as well as decreased rom and strength of right LE s/p right enrrique conversion from a fused hip. He plans to go home with HHPT. Pt. Doing well this afternoon. He likes the chair and wants to stay in it. He ambulated in the room again this pm with RW CGA. He was doing better putting his right heel down and extending his knee some with stance. Gait distance limited by fatigue. He did enrrique exercises in the chair with cues and assistance.   He was able to do a long arc quad this pm and it was already better than before surgery. Hip rom and strength quite limited but moving some better this afternoon. He plans to go home Friday. This section established at most recent assessment   PROBLEM LIST (Impairments causing functional limitations):  1. Decreased Strength  2. Decreased ADL/Functional Activities  3. Decreased Transfer Abilities  4. Decreased Ambulation Ability/Technique  5. Decreased Balance  6. Increased Pain  7. Decreased Activity Tolerance  8. Decreased Flexibility/Joint Mobility  9. Decreased [place] with Home Exercise Program  10. Decreased strength   INTERVENTIONS PLANNED: (Benefits and precautions of physical therapy have been discussed with the patient.)  1. Bed Mobility  2. Cold  3. Gait Training  4. Home Exercise Program (HEP)  5. Range of Motion (ROM)  6. Therapeutic Activites  7. Therapeutic Exercise/Strengthening  8. Transfer Training     TREATMENT PLAN: Frequency/Duration: Follow patient BID for duration of hospital stay to address above goals. Rehabilitation Potential For Stated Goals: Good     RECOMMENDED REHABILITATION/EQUIPMENT: (at time of discharge pending progress): Continue Skilled Therapy and Home Health: Physical Therapy. HISTORY:   History of Present Injury/Illness (Reason for Referral):  S/p right michael  Past Medical History/Comorbidities:   Mr. Oliverio Luz  has a past medical history of Arthritis, Current every day smoker, Diabetes ([facility] [address] History of priapism, Hypertension, Poor historian, and Psychiatric disorder (2019). He also has no past medical history of COPD, Dementia, Endocrine disease, Gastrointestinal disorder, Infectious disease, Neurological disorder, Other ill-defined conditions(799.89), or Sleep disorder. Mr. Oliverio Luz  has a past surgical history that includes hx orthopaedic and hx colonoscopy.    Social History/Living Environment:   Home Environment: Private residence  # Steps to Enter: 1  Rails to Enter: No  One/Two Story Residence: One story  Living Alone: Yes  Support Systems: Friends \ neighbors  Patient Expects to be Discharged to[de-identified] Private residence  Current DME Used/Available at Home: Cane, straight  Tub or Shower Type: Tub/Shower combination    Prior Level of Function/Work/Activity:  independent   Number of Personal Factors/Comorbidities that affect the Plan of Care: 1-2: MODERATE COMPLEXITY   EXAMINATION:   Most Recent Physical Functioning:      Gross Assessment  AROM: Within functional limits(left LE)  Strength: Generally decreased, functional(left LE)        RLE AROM  R Hip Flexion: 75  R Hip ABduction: 5  R Knee Extension: -10            Bed Mobility  Supine to Sit: (in chair)  Scooting: Contact guard assistance    Transfers  Sit to Stand: Contact guard assistance;Minimum assistance  Stand to Sit: Contact guard assistance  Bed to Chair: Moderate assistance    Balance  Sitting: With support  Standing: With support              Weight Bearing Status  Right Side Weight Bearing: As tolerated  Distance (ft): 15 Feet (ft)  Ambulation - Level of Assistance: Contact guard assistance  Assistive Device: Walker, rolling  Speed/Larisa: Delayed  Step Length: Left shortened;Right shortened  Stance: Right decreased  Gait Abnormalities: Antalgic;Decreased step clearance; Toe walking  Interventions: Safety awareness training;Verbal cues     Braces/Orthotics:     Right Hip Cold  Type: Cold/ice packs      Body Structures Involved:  1. Bones  2. Joints  3. Muscles  4. Ligaments Body Functions Affected:  1. Movement Related Activities and Participation Affected:  1. Mobility   Number of elements that affect the Plan of Care: 3: MODERATE COMPLEXITY   CLINICAL PRESENTATION:   Presentation: Stable and uncomplicated: LOW COMPLEXITY   CLINICAL DECISION [facility] [place] [address] [address] AM-PAC 6 Clicks   Basic Mobility Inpatient Short Form  How much difficulty does the patient currently have. .. Unable A Lot A Little None   1.   Turning over in bed (including adjusting bedclothes, sheets and blankets)? [] 1   [x] 2   [] 3   [] 4   2. Sitting down on and standing up from a chair with arms ( e.g., wheelchair, bedside commode, etc.)   [] 1   [x] 2   [] 3   [] 4   3. Moving from lying on back to sitting on the side of the bed? [] 1   [x] 2   [] 3   [] 4   How much help from another person does the patient currently need. .. Total A Lot A Little None   4. Moving to and from a bed to a chair (including a wheelchair)? [] 1   [x] 2   [] 3   [] 4   5. Need to walk in hospital room? [] 1   [] 2   [x] 3   [] 4   6. Climbing 3-5 steps with a railing? [] 1   [x] 2   [] 3   [] 4   © 2007, Trustees of [address] [place] [address], under license to [employer]. All rights reserved     Score:  Initial: 15 Most Recent: X (Date: -- )    Interpretation of Tool:  Represents activities that are increasingly more difficult (i.e. Bed mobility, Transfers, Gait). Medical Necessity:     · Patient is expected to demonstrate progress in   · strength, range of motion, and balance  ·  to   · decrease assistance required with exercises and functional mobility  · . Reason for Services/Other Comments:  · Patient   · continues to require present interventions due to patient's inability to perform exercises and functional mobility independently  · . Use of outcome tool(s) and clinical judgement create a POC that gives a: Clear prediction of patient's progress: LOW COMPLEXITY            TREATMENT:   (In addition to Assessment/Re-Assessment sessions the following treatments were rendered)     Pre-treatment Symptoms/Complaints:  hip pain  Pain Initial:      Post Session:  Just sore   Gait Training (15 Minutes):  Gait training to improve and/or restore physical functioning as related to mobility, strength and balance.   Ambulated 15 Feet (ft) with Contact guard assistance using a Walker, rolling and minimal Safety awareness training;Verbal cues related to their stance phase to promote proper body alignment, promote proper body posture and promote proper body mechanics. Therapeutic Exercise: (15 Minutes):  Exercises per grid below to improve mobility and strength. Required minimal visual, verbal, and manual cues to promote proper body alignment, promote proper body posture, and promote proper body mechanics. Progressed range and repetitions as indicated. Date:  2/3 Date:   Date:     ACTIVITY/EXERCISE AM PM AM PM AM PM   GROUP THERAPY  []  []  []  []  []  []   Ankle Pumps 10a 15a       Quad Sets 10a 15a       Gluteal Sets 10a 15a       Hip ABd/ADduction 10aa 15aa       Straight Leg Raises         Knee Slides 10aa 15aa       Short Arc Quads  15a       Long Arc Quads  15a       Chair Slides                  B = bilateral; AA = active assistive; A = active; P = passive      Treatment/Session Assessment:     Response to Treatment:  Did well, fatigue with gait as takes a lot of effort to ambulate due to chronic issues with hip prior    Education:  [x] Home Exercises  [x] Fall Precautions  [x] Hip Precautions [] D/C Instruction Review  [x] Hip Prosthesis Review  [x] Walker Management/Safety [] Adaptive Equipment as Needed       Interdisciplinary Collaboration:   o Registered Nurse    After treatment position/precautions:   o Up in chair  o Bed/Chair-wheels locked  o Bed in low position  o Call light within reach    Compliance with Program/Exercises: Will assess as treatment progresses. Recommendations/Intent for next treatment session:  Treatment next visit will focus on increasing Mr. Maciel Barboza independence with bed mobility, transfers, gait training, strength/ROM exercises, modalities for pain, and patient education.       Total Treatment Duration:  PT Patient Time In/Time Out  Time In: 1305  Time Out: Kenyatta 63, PT

## 2021-02-03 NOTE — PROGRESS NOTES
Patient A/O x 4 pain well controlled, denies nausea, patient has been up in recliner, moving lower extremities with no deficits, R-side weaker . All Neuro Vascular checks are WDL as documented, patient is voiding, Aquacel dressing to right Hip is C\D\I, no needs at this time.

## 2021-02-03 NOTE — PROGRESS NOTES
Shift assessment complete. Pt a/ox4 and resting in bed. Dressing to right hip c/d/i. Demonstrates strong bilateral plantar/dorsiflexion with pedal pulses present and palpable +2. SCDs in place. IV site c/d/i, patent and capped. Bed in lowest position, wheels locked, side rails x3, gripper socks on, and call light in reach. Encouraged to call for help if needed.

## 2021-02-03 NOTE — PROGRESS NOTES
02/02/21 2117   Oxygen Therapy   O2 Sat (%) 100 %   Pulse via Oximetry 81 beats per minute   O2 Device Room air   O2 Flow Rate (L/min) 0 l/min   Pt on continuous monitor for HS. Alarm limits set. Pt working on IS.

## 2021-02-03 NOTE — OP NOTES
[id number] [address]  OPERATIVE REPORT    Name:  Nichol Neri  MR#:  [id number]  :  1965  ACCOUNT #:  [de-identified]  DATE OF SERVICE:  2021    PREOPERATIVE DIAGNOSES:  Fusion right hip arthroplasty and painful right knee and lumbar spine. POSTOPERATIVE DIAGNOSES:  Fusion right hip arthroplasty and painful right knee and lumbar spine. PROCEDURE PERFORMED:  Conversion right total hip arthroplasty from fusion. ATTENDING SURGEON:  Katerine Lala MD    FIRST ASSISTANTDAVID Torres    ANESTHESIA:  Spinal block. COMPLICATIONS:  None. SPECIMENS REMOVED:  None. IMPLANTS:  [place]/DePuy. ESTIMATED BLOOD LOSS:  500 mL. INDICATIONS FOR PROCEDURE:  The patient is a decade out from a fusion in his right hip. He was fused at about 45 degrees of abduction, external rotation. Because of this, at his young age, he has significant osteoarthritis and deformity of his right knee. He also has degenerative changes and pain in his back. He desired takedown of this fusion to allow alignment of his limb and address one of his knees and alleviate his spine problems. Decision was made to convert it to a total hip arthroplasty. OPERATIVE PROCEDURE:  The patient was taken to the operating room and after adequate induction with spinal block, was prepped and draped in sterile fashion over the right hip. The old incision was utilized in the middle of three incisions and then hockey-stick posteriorly. Dissection was carried through subcutaneous tissues and then an incision made through the fascia. The fascia was dissected by finger dissection. The posterior aspect of the acetabulum was then exposed as well as the posterior aspect of the femur and the greater trochanter. This allowed appreciation of the fusion in the neck region. Dissection was carried anterior and posterior inferior to appreciate the position of the neck. Retractors were placed here for exposure of the neck.   It should be noted that the abductor musculature was intact in continuity with the vastus lateralis, but was weak and atrophied. An oscillating saw was then used to transect the neck and then a wedge taken out from this area to expose the hip screw that was in the center of the hip joint. Dissection was then further carried around the medial and lateral and then anterior inferior to release the bone in total around this hip screw. The hip was then rotated in the screw, removed from its position in the acetabulum and remained prominent from the intertrochanteric area. A mallet was used to remove the screw. An acetabular retractor was placed anterior superior and then bony edges were freshened around the fusion site and he was sequentially reamed to 53 mm to accommodate a 54 mm cup. The acetabulum was then exposed, he was reamed lateral and then sequentially rasped to accommodate a 7 mm ACTIS stem. Once the trial cup and stem were in place, an x-ray was obtained to show appropriate positioning of the implants. These were trial in nature. The trial components were removed. A 54 mm multihole [place] cup Trident 1 was then impacted at about 45 degrees of horizontal tilt, about 20 degrees of anteversion. Two screws were placed in the ileum. A dual-mobility liner was placed. The 7 broach high-offset ACTIS stem with a +8 head seemed to equilibrate the limb lengths the best and offering the greatest stability. A radiograph was obtained to substantiate this. A permanent high-offset 7 ACTIS stem was impacted into place at about 45 degrees of anteversion. A +8 femoral neck with an articular polyethylene head impacted into place. The permanent implants were reduced. He was placed through range of motion noting full extension, stable with internal rotation and minimal pistoning so limb lengths thought to be equilibrated. The area was copiously irrigated, the anterior capsule area injected with Naropin. Betadine solution was used to soak in the wound, this was evacuated, further irrigation ensued. A couple #5 Ethibond were used to reapproximate one little external rotation tissue there was. This tissue was reapproximated posteriorly to allow soft tissue coverage. It was made sure to be dissected free and the sciatic nerve was palpated, noted to be untethered. A running double-stranded PDS used to reapproximate the fascia and deep adipose layer, 2-0 Monocryl subcutaneous adipose layer, staples used to close the skin. Placed a sterile dressing, awaiting transport to recovery room at the time of dictation.         Estrada Kinsey MD      [id number]  D:  02/02/2021 12:22  T:  02/02/2021 22:40  JOB #:  [id number]

## 2021-02-04 VITALS
HEIGHT: 70 IN | BODY MASS INDEX: 30.21 KG/M2 | TEMPERATURE: 98.8 F | WEIGHT: 211 LBS | HEART RATE: 79 BPM | OXYGEN SATURATION: 100 % | DIASTOLIC BLOOD PRESSURE: 81 MMHG | SYSTOLIC BLOOD PRESSURE: 164 MMHG | RESPIRATION RATE: 18 BRPM

## 2021-02-04 LAB — HGB BLD-MCNC: 8.4 G/DL (ref 13.6–17.2)

## 2021-02-04 PROCEDURE — 97110 THERAPEUTIC EXERCISES: CPT

## 2021-02-04 PROCEDURE — 97116 GAIT TRAINING THERAPY: CPT

## 2021-02-04 PROCEDURE — 85018 HEMOGLOBIN: CPT

## 2021-02-04 PROCEDURE — 74011250636 HC RX REV CODE- 250/636: Performed by: PHYSICIAN ASSISTANT

## 2021-02-04 PROCEDURE — 97535 SELF CARE MNGMENT TRAINING: CPT

## 2021-02-04 PROCEDURE — 74011250637 HC RX REV CODE- 250/637: Performed by: PHYSICIAN ASSISTANT

## 2021-02-04 PROCEDURE — 74011000250 HC RX REV CODE- 250: Performed by: PHYSICIAN ASSISTANT

## 2021-02-04 PROCEDURE — 36415 COLL VENOUS BLD VENIPUNCTURE: CPT

## 2021-02-04 RX ORDER — ASPIRIN 81 MG/1
81 TABLET ORAL EVERY 12 HOURS
Qty: 70 TAB | Refills: 0 | Status: SHIPPED | OUTPATIENT
Start: 2021-02-04 | End: 2021-03-11

## 2021-02-04 RX ORDER — HYDROMORPHONE HYDROCHLORIDE 2 MG/1
2 TABLET ORAL
Qty: 30 TAB | Refills: 0 | Status: SHIPPED | OUTPATIENT
Start: 2021-02-04 | End: 2021-02-08

## 2021-02-04 RX ADMIN — DOCUSATE SODIUM 50MG AND SENNOSIDES 8.6MG 2 TABLET: 8.6; 5 TABLET, FILM COATED ORAL at 10:13

## 2021-02-04 RX ADMIN — CEFAZOLIN SODIUM 2 G: 100 INJECTION, POWDER, LYOPHILIZED, FOR SOLUTION INTRAVENOUS at 10:13

## 2021-02-04 RX ADMIN — HYDROMORPHONE HYDROCHLORIDE 2 MG: 2 TABLET ORAL at 06:03

## 2021-02-04 RX ADMIN — HYDROMORPHONE HYDROCHLORIDE 2 MG: 2 TABLET ORAL at 10:14

## 2021-02-04 RX ADMIN — CEFAZOLIN SODIUM 2 G: 100 INJECTION, POWDER, LYOPHILIZED, FOR SOLUTION INTRAVENOUS at 01:51

## 2021-02-04 RX ADMIN — Medication 81 MG: at 10:14

## 2021-02-04 RX ADMIN — ACETAMINOPHEN 1000 MG: 500 TABLET, FILM COATED ORAL at 06:03

## 2021-02-04 RX ADMIN — AMLODIPINE BESYLATE 5 MG: 5 TABLET ORAL at 10:13

## 2021-02-04 RX ADMIN — LISINOPRIL 20 MG: 20 TABLET ORAL at 10:14

## 2021-02-04 RX ADMIN — HYDROMORPHONE HYDROCHLORIDE 2 MG: 2 TABLET ORAL at 01:51

## 2021-02-04 RX ADMIN — Medication 1 AMPULE: at 10:14

## 2021-02-04 NOTE — PROGRESS NOTES
Shift assessment completed. Alert and oriented x4. Pt resting in bed. Right hip Aquacel is clean dry and intact. Sensation present, plantar/dorsiflexion strong, pulses 2+ bilaterally in lower extremities. Lung sounds clear bilaterally. Ice on right hip. Bed locked, in lowest position, call light within reach.

## 2021-02-04 NOTE — DISCHARGE INSTRUCTIONS
[id number] [facility]   Patient Discharge Instructions    Shruthi Nagy / [id number] : 1965    Admitted 2021 Discharged: 2021     IF YOU HAVE ANY PROBLEMS ONCE YOU ARE AT HOME CALL THE FOLLOWING NUMBERS:   Main office number: [phone]    Take Home Medications     · It is important that you take the medication exactly as they are prescribed. · Keep your medication in the bottles provided by the pharmacist and keep a list of the medication names, dosages, and times to be taken in your wallet. · Do not take other medications without consulting your doctor. What to do at [address] your prehospital diet. If you have excessive nausea or vomitting call your doctor's office     Home Physical Therapy is arranged. Use rolling walker when walking. Patients who have had a joint replacement should not drive until you are seen for your follow up appointment by Dr. Raquel Saldana. When to Call    - Call if you have a temperature greater then 101  - Unable to keep food down  - Loose control of your bladder or bowel function  - Are unable to bear any weight   - Need a pain medication refill       DISCHARGE SUMMARY from Nurse    The following personal items collected during your admission are returned to you:   Dental Appliance: Dental Appliances: At bedside  Vision:    Hearing Aid:    Jewelry: Jewelry: None  Clothing: Clothing: At bedside  Other Valuables: Other Valuables: Cell Phone  Valuables sent to safe:      PATIENT INSTRUCTIONS:    After general anesthesia or intravenous sedation, for 24 hours or while taking prescription Narcotics:  · Limit your activities  · Do not drive and operate hazardous machinery  · Do not make important personal or business decisions  · Do  not drink alcoholic beverages  · If you have not urinated within 8 hours after discharge, please contact your surgeon on call.     Report the following to your surgeon:  · Excessive pain, swelling, redness or odor of or around the surgical area  · Temperature over 101  · Nausea and vomiting lasting longer than 4 hours or if unable to take medications  · Any signs of decreased circulation or nerve impairment to extremity: change in color, persistent  numbness, tingling, coldness or increase pain  · Any questions, call office @ [phone]      Keep scheduled follow up appointment. If need to change, call office @ [phone]. *  Please give a list of your current medications to your Primary Care Provider. *  Please update this list whenever your medications are discontinued, doses are      changed, or new medications (including over-the-counter products) are added. *  Please carry medication information at all times in case of emergency situations. Patient Education        Hip Replacement Surgery (Posterior): What to Expect at Home  Your Recovery  Hip replacement surgery replaces the worn parts of your hip joint. When you leave the hospital, you will probably be walking with crutches or a walker. You may be able to climb a few stairs and get in and out of bed and chairs. But you will need someone to help you at home for the next few weeks or until you have more energy and can move around better. If you need more extensive rehab, you may go to a specialized rehab center for more treatment. You will go home with a bandage and stitches, staples, tissue glue, or tape strips. You can remove the bandage when your doctor tells you to. If you have stitches or staples, your doctor will remove them 10 days to 3 weeks after your surgery. Glue or tape strips will fall off on their own over time. You may still have some mild pain, and the area may be swollen for 3 to 4 months after surgery. Your doctor will give you medicine for the pain. You will continue the rehabilitation program (rehab) you started in the hospital. The better you do with your rehab exercises, the sooner you will get your strength and movement back.  Most people are able to return to work 4 weeks to 4 months after surgery. This care sheet gives you a general idea about how long it will take for you to recover. But each person recovers at a different pace. Follow the steps below to get better as quickly as possible. How can you care for yourself at home? Activity    · Your doctor may not want your affected leg to cross the center of your body toward the other leg. If so, your therapist may suggest these ideas:  ? Do not cross your legs. ? Be very careful as you get in or out of bed or a car, so your leg does not cross that imaginary line in the middle of your body.     · Go slowly when you climb stairs. Make sure the lights are on. Have someone watch you, if you can. When you climb stairs:  ? Step up first with your unaffected leg. Then bring the affected leg up to the same step. Bring your crutches or cane up. ? To go down stairs, reverse the order. First, put your crutches or cane on the lower step. Then bring the affected leg down to that step. Finally, step down with the unaffected leg.     · You can ride in a car, but stop at least once every hour to get out and walk around.     · You may want to sleep on your back. Don't reach down too far to pull up blankets when you lie in bed.     · If your doctor recommends exercises, do them as directed. You can cut back on your exercises if your muscles start to ache, but don't stop doing them.     · Rest when you feel tired. You may take a nap, but don't stay in bed all day.     · Work with your physical therapist to learn the best way to exercise. You will probably have to use crutches or a walker for at least 4 to 6 weeks.     · Your doctor may advise you to stay away from activities that put stress on the joint. This includes sports such as tennis, football, and jogging.     · Try not to sit for too long at one time. You will feel less stiff if you take a short walk about every hour.  When you sit, use chairs with arms, and don't sit in low chairs.     · Do not bend over more than 90 degrees (like the angle in a letter \"L\").     · Sleep on your back with your legs slightly apart or on your side with a pillow between your knees for about 6 weeks or as your doctor tells you. Do not sleep on your stomach or affected leg.     · Ask your doctor when you can drive again.     · Most people are able to return to work 4 weeks to 4 months after surgery.     · Ask your doctor when it is okay for you to have sex. Diet    · By the time you leave the hospital, you will probably be eating your normal diet. If your stomach is upset, try bland, low-fat foods like plain rice, broiled chicken, toast, and yogurt. Your doctor may recommend that you take iron and vitamin supplements.     · Drink plenty of fluids (unless your doctor tells you not to).   · Eat healthy foods, and watch your portion sizes. Try to stay at your ideal weight. Too much weight puts more stress on your new hip joint.     · You may notice that your bowel movements are not regular right after your surgery. This is common. Try to avoid constipation and straining with bowel movements. You may want to take a fiber supplement every day. If you have not had a bowel movement after a couple of days, ask your doctor about taking a mild laxative. Medicines    · Your doctor will tell you if and when you can restart your medicines. He or she will also give you instructions about taking any new medicines.     · If you take aspirin or some other blood thinner, ask your doctor if and when to start taking it again. Make sure that you understand exactly what your doctor wants you to do.     · Your doctor may give you a blood-thinning medicine to prevent blood clots. If you take a blood thinner, be sure you get instructions about how to take your medicine safely. Blood thinners can cause serious bleeding problems. This medicine could be in pill form or as a shot (injection).  If a shot is necessary, your doctor will tell you how to do this.     · Be safe with medicines. Take pain medicines exactly as directed. ? If the doctor gave you a prescription medicine for pain, take it as prescribed. ? If you are not taking a prescription pain medicine, ask your doctor if you can take an over-the-counter medicine.     · If you think your pain medicine is making you sick to your stomach:  ? Take your medicine after meals (unless your doctor has told you not to). ? Ask your doctor for a different pain medicine.     · If your doctor prescribed antibiotics, take them as directed. Do not stop taking them just because you feel better. You need to take the full course of antibiotics. Incision care    · If your doctor told you how to care for your cut (incision), follow your doctor's instructions. You will have a dressing over the cut. A dressing helps the incision heal and protects it. Your doctor will tell you how to take care of this.     · If you did not get instructions, follow this general advice:  ? If you have strips of tape on the cut the doctor made, leave the tape on for a week or until it falls off.  ? If you have stitches or staples, your doctor will tell you when to come back to have them removed. ? If you have skin adhesive on the cut, leave it on until it falls off. Skin adhesive is also called glue or liquid stitches. ? Change the bandage every day. ? Wash the area daily with warm water, and pat it dry. Don't use hydrogen peroxide or alcohol. They can slow healing. ? You may cover the area with a gauze bandage if it oozes fluid or rubs against clothing. ? You may shower 24 to 48 hours after surgery. Pat the incision dry. Don't swim or take a bath for the first 2 weeks, or until your doctor tells you it is okay. Exercise    · Your rehab program will include a number of exercises to do. Always do them as your therapist tells you.    Ice and elevation    · For pain, put ice or a cold pack on the area for 10 to 20 minutes at a time. Put a thin cloth between the ice and your skin.     · Your ankle may swell for about 3 months. Prop up your ankle when you ice it or anytime you sit or lie down. Try to keep it above the level of your heart. This will help reduce swelling. Other instructions    · Continue to wear your support stockings as your doctor says. These help to prevent blood clots. How long you'll have to wear them depends on your activity level and the amount of swelling you have. Most people wear these stockings for 4 to 6 weeks after surgery.     · Try to prevent falls. To avoid falling:  ? Arrange furniture so that you will not trip on it. ? Get rid of throw rugs, and move electrical cords out of the way. ? Walk only in areas with plenty of light. ? Put grab bars in showers and bathtubs. ? Avoid icy or snowy sidewalks. ? Wear shoes with sturdy, flat soles. Follow-up care is a key part of your treatment and safety. Be sure to make and go to all appointments, and call your doctor if you are having problems. It's also a good idea to know your test results and keep a list of the medicines you take. When should you call for help? Call 911 anytime you think you may need emergency care. For example, call if:    · You passed out (lost consciousness).     · You have severe trouble breathing.     · You have sudden chest pain and shortness of breath, or you cough up blood. Call your doctor now or seek immediate medical care if:    · You have signs that your hip may be dislocated, including:  ? Severe pain and not being able to stand. ? A crooked leg that looks like your hip is out of position. ? Not being able to bend or straighten your leg.     · Your leg or foot is cool or pale or changes color.     · You cannot feel or move your leg.     · You have signs of a blood clot, such as:  ? Pain in your calf, back of the knee, thigh, or groin. ?  Redness and swelling in your leg or groin.     · Your incision comes open and begins to bleed, or the bleeding increases.     · You feel like your heart is racing or beating irregularly.     · You have signs of infection, such as:  ? Increased pain, swelling, warmth, or redness. ? Red streaks leading from the incision. ? Pus draining from the incision. ? A fever. Watch closely for changes in your health, and be sure to contact your doctor if:    · You do not have a bowel movement after taking a laxative.     · You do not get better as expected. Where can you learn more? Go to http://www.gray.com/  Enter Q746 in the search box to learn more about \"Hip Replacement Surgery (Posterior): What to Expect at Home. \"  Current as of: March 2, 2020               Content Version: 12.6  © 9481-3396 [employer]. Care instructions adapted under license by [employer] (which disclaims liability or warranty for this information). If you have questions about a medical condition or this instruction, always ask your healthcare professional. [address] any warranty or liability for your use of this information. These are general instructions for a healthy lifestyle:    No smoking/ No tobacco products/ Avoid exposure to second hand smoke    Surgeon General's Warning:  Quitting smoking now greatly reduces serious risk to your health. Obesity, smoking, and sedentary lifestyle greatly increases your risk for illness    A healthy diet, regular physical exercise & weight monitoring are important for maintaining a healthy lifestyle    You may be retaining fluid if you have a history of heart failure or if you experience any of the following symptoms:  Weight gain of 3 pounds or more overnight or 5 pounds in a week, increased swelling in our hands or feet or shortness of breath while lying flat in bed.   Please call your doctor as soon as you notice any of these symptoms; do not wait until your next office visit.    Recognize signs and symptoms of STROKE:    F-face looks uneven    A-arms unable to move or move even    S-speech slurred or non-existent    T-time-call 911 as soon as signs and symptoms begin-DO NOT go       Back to bed or wait to see if you get better-TIME IS BRAIN.        The discharge information has been reviewed with the patient.  The patient verbalized understanding.      Information obtained by :  I understand that if any problems occur once I am at home I am to contact my physician.    I understand and acknowledge receipt of the instructions indicated above.                                                                                                                                           Physician's or R.N.'s Signature                                                                  Date/Time                                                                                                                                              Patient or Representative Signature                                                          Date/Time

## 2021-02-04 NOTE — PROGRESS NOTES
Shift assessment complete. Pt a/ox4 and resting in bed. Dressing to right hip c/d/i with ice in place. Pt demonstrates strong bilateral plantar/dorsiflexion with pedal pulses present and palpable +2. SCDs in place. IV site c/d/i, patent and capped. Bed in lowest position, wheels locked, side rails x3, gripper socks on, and call light in reach. Encouraged to call for help if needed and pt verbalized understanding.

## 2021-02-04 NOTE — PROGRESS NOTES
Problem: Self Care Deficits Care Plan (Adult)  Goal: *Acute Goals and Plan of Care (Insert Text)  Description: GOALS:   DISCHARGE GOALS (in preparation for going home/rehab):  3 days  1. Mr. Antonia Keen will perform one lower body dressing activity with minimal assistance with adaptive equipment to demonstrate improved functional mobility and safety. -GOAL MET 2/4/2021   2. Mr. Antonia Keen will perform one lower body bathing activity with minimal  assistance with adaptive equipment to demonstrate improved functional mobility and safety. -GOAL MET 2/4/2021   3. Mr. Antonia Keen will perform toileting/toilet transfer with contact guard assistance with adaptive equipment to demonstrate improved functional mobility and safety. -GOAL MET 2/4/2021   4. Mr. Antonia Keen will perform shower transfer with contact guard assistance with adaptive equipment to demonstrate improved functional mobility and safety. -GOAL MET 2/4/2021   5. Mr. Antonia Keen will state ENRRIQUE precautions with two verbal cues to demonstrate improved functional mobility and safety. -GOAL MET 2/4/2021         JOINT CAMP OCCUPATIONAL THERAPY ENRRIQUE: Daily Note and Discharge 2/4/2021  INPATIENT: Hospital Day: 3  Payor: [employer] / Plan: [address] / Product Type: [employer] Medicare /      NAME/AGE/GENDER: Gaye Soto is a 50 y.o. male   PRIMARY DIAGNOSIS:  Arthrofibrosis of right hip joint [M24.651]   Procedure(s) and Anesthesia Type:     * RIGHT HIP ARTHROPLASTY TOTAL CONVERSION/ DEPUY - Spinal (Right)  ICD-10: Treatment Diagnosis:    · Pain in Right Hip (M25.551)  · Stiffness of Right Hip, Not elsewhere classified (M25.651)      ASSESSMENT:     Mr. Antonia Keen is s/p Right ENRRIQUE and presents with decreased weight bearing on R LE and decreased independence with functional mobility and activities of daily living.   Patient completed shower and dressing as charted below in ADL grid and is ambulating with rolling walker and supervision assist.  Patient has met 5/5 goals and plans to return home with good family support. Will do well at home for self cares and transfers during ADL's.  D/C OT for acute deficits. This section established at most recent assessment   PROBLEM LIST (Impairments causing functional limitations):  1. Decreased Strength  2. Decreased ADL/Functional Activities  3. Decreased Transfer Abilities  4. Increased Pain  5. Increased Fatigue  6. Decreased Flexibility/Joint Mobility  7. Decreased Knowledge of Precautions   INTERVENTIONS PLANNED: (Benefits and precautions of occupational therapy have been discussed with the patient.)  1. Activities of daily living training  2. Adaptive equipment training  3. Balance training  4. Clothing management  5. Donning&doffing training  6. Theraputic activity     TREATMENT PLAN: Frequency/Duration: Follow patient 5x a week to address above goals. Rehabilitation Potential For Stated Goals: Good     RECOMMENDED REHABILITATION/EQUIPMENT: (at time of discharge pending progress): Continue Skilled Therapy. OCCUPATIONAL PROFILE AND HISTORY:   History of Present Injury/Illness (Reason for Referral): Pt presents this date s/p (Right) ENRRIQUE. Past Medical History/Comorbidities:   Mr. Michelle Denny  has a past medical history of Arthritis, Current every day smoker, Diabetes [facility]), History of priapism, Hypertension, Poor historian, and Psychiatric disorder (2019). He also has no past medical history of COPD, Dementia, Endocrine disease, Gastrointestinal disorder, Infectious disease, Neurological disorder, Other ill-defined conditions(799.89), or Sleep disorder. Mr. Michelle Denny  has a past surgical history that includes hx orthopaedic and hx colonoscopy.   Social History/Living Environment:   Home Environment: Private residence  # Steps to Enter: 1  One/Two Story Residence: One story  Living Alone: Yes  Support Systems: Family member(s)  Patient Expects to be Discharged to[de-identified] Private residence  Prior Level of Function/Work/Activity: Indpeendent prior. Number of Personal Factors/Comorbidities that affect the Plan of Care: Expanded review of therapy/medical records (1-2):  MODERATE COMPLEXITY   ASSESSMENT OF OCCUPATIONAL PERFORMANCE[de-identified]   Most Recent Physical Functioning:   Balance  Sitting: Intact  Standing: With support                    Coordination  Fine Motor Skills-Upper: Left Intact; Right Intact  Gross Motor Skills-Upper: Left Intact; Right Intact         Mental Status  Neurologic State: Alert  Orientation Level: Oriented X4  Cognition: Appropriate decision making  Perception: Appears intact          RLE AROM  R Hip Flexion: 75  R Hip ABduction: 5     Basic ADLs (From Assessment) Complex ADLs (From Assessment)   Basic ADL  Feeding: Independent  Oral Facial Hygiene/Grooming: Setup  Bathing: Contact guard assistance  Type of Bath: Chlorhexidine (CHG), Full, Shower  Upper Body Dressing: Setup  Lower Body Dressing: Minimum assistance  Toileting: Contact guard assistance     Grooming/Bathing/Dressing Activities of Daily Living                       Functional Transfers  Toilet Transfer : Contact guard assistance  Shower Transfer: Contact guard assistance     Bed/Mat Mobility  Supine to Sit: Stand-by assistance  Sit to Stand: Stand-by assistance  Stand to Sit: Stand-by assistance  Scooting: Stand-by assistance         Physical Skills Involved:  1. Range of Motion  2. Balance  3. Strength  4. Activity Tolerance Cognitive Skills Affected (resulting in the inability to perform in a timely and safe manner):  1. [facility] Psychosocial Skills Affected:  1. Social Interaction  2. Emotional Regulation  3. Self-Awareness   Number of elements that affect the Plan of Care: 3-5:  MODERATE COMPLEXITY   CLINICAL DECISION MAKING:   [employer] AM-PAC 6 Clicks   Daily Activity Inpatient Short Form  How much help from another person does the patient currently need. .. Total A Lot A Little None   1.   Putting on and taking off regular lower body clothing? [] 1   [] 2   [x] 3   [] 4   2. Bathing (including washing, rinsing, drying)? [] 1   [] 2   [x] 3   [] 4   3. Toileting, which includes using toilet, bedpan or urinal?   [] 1   [] 2   [x] 3   [] 4   4. Putting on and taking off regular upper body clothing? [] 1   [] 2   [x] 3   [] 4   5. Taking care of personal grooming such as brushing teeth? [] 1   [] 2   [] 3   [x] 4   6. Eating meals? [] 1   [] 2   [] 3   [x] 4   © 2007, Trustees of [facility] [employer], under license to [employer]. All rights reserved     Score:  Initial: 17 Most Recent: 21 (Date: -- )    Interpretation of Tool:  Represents activities that are increasingly more difficult (i.e. Bed mobility, Transfers, Gait). Medical Necessity:     · Skilled intervention continues to be required due to Deficits listed above. Reason for Services/Other Comments:  · Patient continues to require skilled intervention due to   · New ENRRIQUE  · . Use of outcome tool(s) and clinical judgement create a POC that gives a: MODERATE COMPLEXITY            TREATMENT:   (In addition to Assessment/Re-Assessment sessions the following treatments were rendered)     Pre-treatment Symptoms/Complaints:    Pain: Initial:   Pain Intensity 1: 5  Pain Location 1: Hip  Pain Orientation 1: Right  Post Session:  6     Self Care: (40): Procedure(s) (per grid) utilized to improve and/or restore self-care/home management as related to bathing, toileting and grooming. Required minimal verbal and tactile cueing to facilitate activities of daily living skills. Treatment/Session Assessment:     Response to Treatment:  Good, sitting up in recliner.     Education:  [] Home Exercises  [x] Fall Precautions  [x] Hip Precautions [] Going Home Video  [] Knee/Hip Prosthesis Review  [x] Walker Management/Safety [x] Adaptive Equipment as Needed       Interdisciplinary Collaboration:   o Physical Therapist  o Occupational Therapist  o Registered Nurse    After treatment position/precautions:   o Up in chair  o Bed/Chair-wheels locked  o Caregiver at bedside  o Call light within reach  o RN notified     Compliance with Program/Exercises: Compliant all of the time, Will assess as treatment progresses. Recommendations/Intent for next treatment session:  D/C OT for acute deficits.       Total Treatment Duration:  OT Patient Time In/Time Out  Time In: 0913  Time Out: 0215 [facility]

## 2021-02-04 NOTE — PROGRESS NOTES
2021         Post Op day: 2 Days Post-Op   Admit Diagnosis: Arthrofibrosis of right hip joint [M24.651]  Osteoarthritis of right hip [M16.11]  LAB:    Recent Results (from the past 24 hour(s))   HEMOGLOBIN    Collection Time: 21  5:23 AM   Result Value Ref Range    HGB 8.4 (L) 13.6 - 17.2 g/dL     Vital Signs:    Patient Vitals for the past 8 hrs:   BP Temp Pulse Resp SpO2   21 0315 (!) 143/75 98.3 °F (36.8 °C) 75 18 97 %   21 2315 (!) 140/75 98.3 °F (36.8 °C) 87 18 97 %     Temp (24hrs), Av.3 °F (36.8 °C), Min:97.8 °F (36.6 °C), Max:98.7 °F (37.1 °C)    Pain Control:   Pain Assessment  Pain Scale 1: Numeric (0 - 10)  Pain Intensity 1: 5  Pain Onset 1: at rest  Pain Location 1: Hip  Pain Orientation 1: Right  Pain Description 1: Aching  Pain Intervention(s) 1: Medication (see MAR)  Subjective: Doing well, pain is well controlled, no complaints     Objective:  No Acute Distress, Alert and Oriented, Neurovascular exam is normal       Assessment:   Patient Active Problem List   Diagnosis Code    Elevated serum creatinine R79.89    Snoring R06.83    Osteoarthritis of right hip M16.11       Status Post Procedure(s) (LRB):  RIGHT HIP ARTHROPLASTY TOTAL CONVERSION/ DEPUY (Right)        Plan: Continue Physical Therapy, Monitor Hgb. ASA/SCDs for DVT prophylaxis. Anticipate d/c to home tomorrow.   Patient seen by Dr. Butt Close this AM.   Signed By: DAVID Whitaker

## 2021-02-04 NOTE — PROGRESS NOTES
Problem: Mobility Impaired (Adult and Pediatric)  Goal: *Acute Goals and Plan of Care (Insert Text)  Outcome: Progressing Towards Goal  Note: GOALS (1-4 days):  (1.)Mr. Farideh Bautista will move from supine to sit and sit to supine  in bed with STAND BY ASSIST.    (2.)Mr. Farideh Bautista will transfer from bed to chair and chair to bed with STAND BY ASSIST using the least restrictive device. Goal met   (3.)Mr. Farideh Bautista will ambulate with STAND BY ASSIST for 100 feet with the least restrictive device. (4.)Mr. Farideh Bautista will ambulate up/down 1 steps with MINIMAL ASSIST with device as needed. (5.)Mr. Farideh Bautista will state/observe MICHAEL precautions with 0 verbal cues. Goal met  ________________________________________________________________________________________________       PHYSICAL THERAPY JOINT CAMP MICHAEL: Daily Note and AM 2/4/2021  INPATIENT: Hospital Day: 3  Payor: Nikolay Carpenter / Plan: [address] / Product Type: [employer] /      NAME/AGE/GENDER: Dana Blum is a 50 y.o. male   PRIMARY DIAGNOSIS:  Arthrofibrosis of right hip joint [M24.651]   Procedure(s) and Anesthesia Type:     * RIGHT HIP ARTHROPLASTY TOTAL CONVERSION/ DEPUY - Spinal (Right)  ICD-10: Treatment Diagnosis:    · Pain in Right Hip (M25.551)  · Stiffness of Right Hip, Not elsewhere classified (M25.651)  · Difficulty in walking, Not elsewhere classified (R26.2)      ASSESSMENT:     Mr. Farideh Bautista presents with decreased functional mobility and gait as well as decreased rom and strength of right LE s/p right michael conversion from a fused hip. He plans to go home with HHPT. Pt. Reports that he is doing very well and he is very eager to go home today. He said he feels safe getting around and is very comfortable going home today. We reviewed michael precautions and general safety. He did michael exercises with cues and assistance. He is moving his right leg a little better but needs some assistance with exercises.  He is wearing a knee brace which he says is helpful. He ambulated in the [place] with RW.  Gait is slower and takes him extra time but stable. Gait distance limited by fatigue but he assured me that he did not have to walk far at home to get in or around the house to function. He has no steps. Progress has been slower as one would expect with his hip history. He had no questions or concerns about going home. This section established at most recent assessment   PROBLEM LIST (Impairments causing functional limitations):  1. Decreased Strength  2. Decreased ADL/Functional Activities  3. Decreased Transfer Abilities  4. Decreased Ambulation Ability/Technique  5. Decreased Balance  6. Increased Pain  7. Decreased Activity Tolerance  8. Decreased Flexibility/Joint Mobility  9. Decreased [place] with Home Exercise Program  10. Decreased strength   INTERVENTIONS PLANNED: (Benefits and precautions of physical therapy have been discussed with the patient.)  1. Bed Mobility  2. Cold  3. Gait Training  4. Home Exercise Program (HEP)  5. Range of Motion (ROM)  6. Therapeutic Activites  7. Therapeutic Exercise/Strengthening  8. Transfer Training     TREATMENT PLAN: Frequency/Duration: Follow patient BID for duration of hospital stay to address above goals. Rehabilitation Potential For Stated Goals: Good     RECOMMENDED REHABILITATION/EQUIPMENT: (at time of discharge pending progress): Continue Skilled Therapy and Home Health: Physical Therapy. HISTORY:   History of Present Injury/Illness (Reason for Referral):  S/p right michael  Past Medical History/Comorbidities:   Mr. Susan Hua  has a past medical history of Arthritis, Current every day smoker, Diabetes ([facility] [address] History of priapism, Hypertension, Poor historian, and Psychiatric disorder (2019). He also has no past medical history of COPD, Dementia, Endocrine disease, Gastrointestinal disorder, Infectious disease, Neurological disorder, Other ill-defined conditions(799.89), or Sleep disorder.    King Ivey  has a past surgical history that includes hx orthopaedic and hx colonoscopy. Social History/Living Environment:   Home Environment: Private residence  # Steps to Enter: 1  Rails to Enter: No  One/Two Story Residence: One story  Living Alone: Yes  Support Systems: Friends \ neighbors  Patient Expects to be Discharged to[de-identified] Private residence  Current DME Used/Available at Home: Cane, straight  Tub or Shower Type: Tub/Shower combination    Prior Level of Function/Work/Activity:  independent   Number of Personal Factors/Comorbidities that affect the Plan of Care: 1-2: MODERATE COMPLEXITY   EXAMINATION:   Most Recent Physical Functioning:               RLE AROM  R Hip Flexion: 75  R Hip ABduction: 5                 Transfers  Sit to Stand: Stand-by assistance  Stand to Sit: Stand-by assistance    Balance  Sitting: Intact  Standing: With support              Weight Bearing Status  Right Side Weight Bearing: As tolerated  Distance (ft): 60 Feet (ft)  Ambulation - Level of Assistance: Stand-by assistance  Assistive Device: Walker, rolling  Speed/Larisa: Delayed  Step Length: Left shortened;Right shortened  Stance: Right decreased  Gait Abnormalities: Antalgic;Decreased step clearance; Toe walking  Interventions: Safety awareness training;Verbal cues     Braces/Orthotics:     Right Hip Cold  Type: (declined)      Body Structures Involved:  1. Bones  2. Joints  3. Muscles  4. Ligaments Body Functions Affected:  1. Movement Related Activities and Participation Affected:  1. Mobility   Number of elements that affect the Plan of Care: 3: MODERATE COMPLEXITY   CLINICAL PRESENTATION:   Presentation: Stable and uncomplicated: LOW COMPLEXITY   CLINICAL DECISION MAKIN [facility] [address] [id number] AM-PAC 6 Clicks   Basic Mobility Inpatient Short Form  How much difficulty does the patient currently have. .. Unable A Lot A Little None   1. Turning over in bed (including adjusting bedclothes, sheets and blankets)?    [] 1   [x] 2   [] 3   [] 4   2. Sitting down on and standing up from a chair with arms ( e.g., wheelchair, bedside commode, etc.)   [] 1   [x] 2   [] 3   [] 4   3. Moving from lying on back to sitting on the side of the bed? [] 1   [x] 2   [] 3   [] 4   How much help from another person does the patient currently need. .. Total A Lot A Little None   4. Moving to and from a bed to a chair (including a wheelchair)? [] 1   [x] 2   [] 3   [] 4   5. Need to walk in hospital room? [] 1   [] 2   [x] 3   [] 4   6. Climbing 3-5 steps with a railing? [] 1   [x] 2   [] 3   [] 4   © 2007, Trustees of [address] [place] [address] [address] [id number], under license to [employer]. All rights reserved     Score:  Initial: 15 Most Recent: X (Date: -- )    Interpretation of Tool:  Represents activities that are increasingly more difficult (i.e. Bed mobility, Transfers, Gait). Medical Necessity:     · Patient is expected to demonstrate progress in   · strength, range of motion, and balance  ·  to   · decrease assistance required with exercises and functional mobility  · . Reason for Services/Other Comments:  · Patient   · continues to require present interventions due to patient's inability to perform exercises and functional mobility independently  · . Use of outcome tool(s) and clinical judgement create a POC that gives a: Clear prediction of patient's progress: LOW COMPLEXITY            TREATMENT:   (In addition to Assessment/Re-Assessment sessions the following treatments were rendered)     Pre-treatment Symptoms/Complaints:  hip pain  Pain Initial:      Post Session:  5-6   Gait Training (15 Minutes):  Gait training to improve and/or restore physical functioning as related to mobility, strength and balance. Ambulated 60 Feet (ft) with Stand-by assistance using a Walker, rolling and minimal Safety awareness training;Verbal cues related to their stance phase to promote proper body alignment, promote proper body posture and promote proper body mechanics. Therapeutic Exercise: (15 Minutes):  Exercises per grid below to improve mobility and strength. Required minimal visual, verbal, and manual cues to promote proper body alignment, promote proper body posture, and promote proper body mechanics. Progressed range and repetitions as indicated. Date:  2/3 Date:  2/4 Date:     ACTIVITY/EXERCISE AM PM AM PM AM PM   GROUP THERAPY  []  []  []  []  []  []   Ankle Pumps 10a 15a 15a      Quad Sets 10a 15a 15a      Gluteal Sets 10a 15a 15a      Hip ABd/ADduction 10aa 15aa 15aa      Straight Leg Raises         Knee Slides 10aa 15aa 15aa      Short Arc Quads  15a 15a      Long Arc Quads  15a 15a      Chair Slides                  B = bilateral; AA = active assistive; A = active; P = passive      Treatment/Session Assessment:     Response to Treatment:  Making progress    Education:  [x] Home Exercises  [x] Fall Precautions  [x] Hip Precautions [x] D/C Instruction Review  [x] Hip Prosthesis Review  [x] Walker Management/Safety [] Adaptive Equipment as Needed       Interdisciplinary Collaboration:   o Registered Nurse    After treatment position/precautions:   o Up in chair  o Bed/Chair-wheels locked  o Bed in low position  o Call light within reach    Compliance with Program/Exercises: Compliant most of the time. Recommendations/Intent for next treatment session:  Treatment next visit will focus on increasing Mr. Mateo Washburn independence with bed mobility, transfers, gait training, strength/ROM exercises, modalities for pain, and patient education.       Total Treatment Duration:  PT Patient Time In/Time Out  Time In: 1115  Time Out: 2009 [facility],

## 2021-02-05 NOTE — DISCHARGE SUMMARY
[facility]  Total Joint Discharge Summary      Patient ID:  Mingo Wilkes  [id number]  50 y.o.  1965    Admit date: 2/2/2021  Discharge date and time: 2/4/2021  Admitting Physician: Deana Mena MD  Surgeon: Deana Mena  Admission Diagnoses: Arthrofibrosis of right hip joint [M24.651]  Osteoarthritis of right hip [M16.11]  Discharge Diagnoses: Active Problems:    Osteoarthritis of right hip (2/2/2021)    Procedure: Conversion of fused right hip joint to total hip arthroplasty                            Perioperative Antibiotics: Ancef 1 to 2 mg was given depending on patients weight. If allergic to Ancef or due to other indications, patient was given Vancomycin. Hospital Medications given:   [unfilled]  [unfilled]  [unfilled]    Additional DVT Prophylaxis:  JERRELL Hose and Plexi-Pulse    Postoperative transfusions:   none  Post Op complications: none    Hemoglobin at discharge:   Lab Results   Component Value Date/Time    HGB 8.4 (L) 02/04/2021 05:23 AM       Wound appears to be healing without any evidence of infection. Physical Therapy started on the day of surgery and progressed. PT/OT:         Activity Response: Tolerated well  Assistive Device: Walker (comment)  RLE AROM  R Hip Flexion: 75  R Hip ABduction: 5  R Knee Extension: -10             Discharged to: Home with Home Health    Discharge condition:  Stable    Discharge instructions:  - Rx pain medication given  - Anticoagulate with: Ecotrin 81 mg PO BID x 5 weeks unless pt is on other anticoagulants  - Resume pre hospital diet            - Resume home medications per medical continuation form  -Leave aquacel dressing in place, change as needed.  -Discontinue staples on POD #10 and apply steri-strips, if wound adequately healed.      - Ambulate with walker, appropriate total joint protocol  - Follow up in office as scheduled       Signed:  DAVID Hodgson  2/5/2021  10:50 AM

## 2021-02-06 ENCOUNTER — HOME CARE VISIT (OUTPATIENT)
Dept: SCHEDULING | Facility: HOME HEALTH | Age: 56
End: 2021-02-06
Payer: MEDICARE

## 2021-02-06 VITALS
RESPIRATION RATE: 18 BRPM | TEMPERATURE: 97.3 F | DIASTOLIC BLOOD PRESSURE: 80 MMHG | HEART RATE: 76 BPM | SYSTOLIC BLOOD PRESSURE: 142 MMHG

## 2021-02-06 PROCEDURE — 400013 HH SOC

## 2021-02-06 PROCEDURE — A6402 STERILE GAUZE <= 16 SQ IN: HCPCS

## 2021-02-06 PROCEDURE — G0151 HHCP-SERV OF PT,EA 15 MIN: HCPCS

## 2021-02-06 PROCEDURE — A6257 TRANSPARENT FILM <= 16 SQ IN: HCPCS

## 2021-02-08 ENCOUNTER — HOME CARE VISIT (OUTPATIENT)
Dept: SCHEDULING | Facility: HOME HEALTH | Age: 56
End: 2021-02-08
Payer: MEDICARE

## 2021-02-08 VITALS
HEART RATE: 74 BPM | TEMPERATURE: 98.1 F | SYSTOLIC BLOOD PRESSURE: 160 MMHG | RESPIRATION RATE: 17 BRPM | DIASTOLIC BLOOD PRESSURE: 98 MMHG

## 2021-02-08 PROCEDURE — G0157 HHC PT ASSISTANT EA 15: HCPCS

## 2021-02-10 ENCOUNTER — HOME CARE VISIT (OUTPATIENT)
Dept: SCHEDULING | Facility: HOME HEALTH | Age: 56
End: 2021-02-10
Payer: MEDICARE

## 2021-02-10 VITALS
RESPIRATION RATE: 17 BRPM | SYSTOLIC BLOOD PRESSURE: 164 MMHG | TEMPERATURE: 98.1 F | HEART RATE: 88 BPM | DIASTOLIC BLOOD PRESSURE: 80 MMHG

## 2021-02-10 PROCEDURE — G0157 HHC PT ASSISTANT EA 15: HCPCS

## 2021-02-12 ENCOUNTER — HOME CARE VISIT (OUTPATIENT)
Dept: SCHEDULING | Facility: HOME HEALTH | Age: 56
End: 2021-02-12
Payer: MEDICARE

## 2021-02-12 VITALS
SYSTOLIC BLOOD PRESSURE: 164 MMHG | RESPIRATION RATE: 16 BRPM | HEART RATE: 78 BPM | TEMPERATURE: 98.6 F | DIASTOLIC BLOOD PRESSURE: 80 MMHG

## 2021-02-12 PROCEDURE — G0157 HHC PT ASSISTANT EA 15: HCPCS

## 2021-02-16 ENCOUNTER — HOME CARE VISIT (OUTPATIENT)
Dept: HOME HEALTH SERVICES | Facility: HOME HEALTH | Age: 56
End: 2021-02-16
Payer: MEDICARE

## 2021-02-18 ENCOUNTER — HOME CARE VISIT (OUTPATIENT)
Dept: SCHEDULING | Facility: HOME HEALTH | Age: 56
End: 2021-02-18
Payer: MEDICARE

## 2021-02-18 VITALS
RESPIRATION RATE: 17 BRPM | HEART RATE: 88 BPM | DIASTOLIC BLOOD PRESSURE: 80 MMHG | SYSTOLIC BLOOD PRESSURE: 140 MMHG | TEMPERATURE: 98.1 F

## 2021-02-18 PROCEDURE — G0157 HHC PT ASSISTANT EA 15: HCPCS

## 2021-02-22 ENCOUNTER — HOME CARE VISIT (OUTPATIENT)
Dept: HOME HEALTH SERVICES | Facility: HOME HEALTH | Age: 56
End: 2021-02-22
Payer: MEDICARE

## 2021-02-22 ENCOUNTER — HOME CARE VISIT (OUTPATIENT)
Dept: SCHEDULING | Facility: HOME HEALTH | Age: 56
End: 2021-02-22
Payer: MEDICARE

## 2021-02-22 VITALS
SYSTOLIC BLOOD PRESSURE: 140 MMHG | HEART RATE: 100 BPM | RESPIRATION RATE: 17 BRPM | TEMPERATURE: 98.5 F | DIASTOLIC BLOOD PRESSURE: 68 MMHG

## 2021-02-22 PROCEDURE — G0157 HHC PT ASSISTANT EA 15: HCPCS

## 2021-02-24 ENCOUNTER — HOME CARE VISIT (OUTPATIENT)
Dept: SCHEDULING | Facility: HOME HEALTH | Age: 56
End: 2021-02-24
Payer: MEDICARE

## 2021-02-24 VITALS
TEMPERATURE: 97.8 F | DIASTOLIC BLOOD PRESSURE: 84 MMHG | HEART RATE: 86 BPM | SYSTOLIC BLOOD PRESSURE: 130 MMHG | RESPIRATION RATE: 16 BRPM

## 2021-02-24 PROCEDURE — G0151 HHCP-SERV OF PT,EA 15 MIN: HCPCS

## 2021-02-25 ENCOUNTER — HOSPITAL ENCOUNTER (OUTPATIENT)
Dept: PHYSICAL THERAPY | Age: 56
Discharge: HOME | End: 2021-02-25
Payer: MEDICARE

## 2021-02-25 PROCEDURE — 97140 MANUAL THERAPY 1/> REGIONS: CPT

## 2021-02-25 PROCEDURE — 97016 VASOPNEUMATIC DEVICE THERAPY: CPT

## 2021-02-25 PROCEDURE — 97162 PT EVAL MOD COMPLEX 30 MIN: CPT

## 2021-02-25 PROCEDURE — 97110 THERAPEUTIC EXERCISES: CPT

## 2021-02-25 NOTE — THERAPY EVALUATION
Alondra Garcia : 1965 [address] @ [address] [place] [address]. [phone]   [phone] OUTPATIENT PHYSICAL THERAPY:Initial Assessment 2021 ICD-10: Treatment Diagnosis: Pain in right hip (M25.551) and Stiffness of right hip, not elsewhere classified (M25.651), Low back pain (M54.5) and Stiffness of unspecified joint, not elsewhere classified (M25.60) and Muscle weakness (generalized) (M62.81) and Other abnormalities of gait and mobility (R26.89) Precautions/Allergies:  
Codeine Fall Risk Score:   
 Ambulatory/Rehab Services H2 Model Falls Risk Assessment Risk Factors: 
     (1)  Gender [Male] Ability to Rise from Chair: 
     (1)  Pushes up, successful in one attempt Falls Prevention Plan: 
     Physical Limitations to Exercise (specify):  limit motion due to previous fusion Total: (5 or greater = High Risk): 2  
  [facility] of [facility] Daniella V[facility] States Patent #[id number]. Federal Law prohibits the replication, distribution or use without written permission from [facility] of CURRENT MD Orders: eval and treat MEDICAL/REFERRING DIAGNOSIS: 
Presence of right artificial hip joint [Z96.641] DATE OF ONSET: 21 surgery REFERRING PHYSICIAN: Fred Ivey,* 
RETURN PHYSICIAN APPOINTMENT: next week INITIAL ASSESSMENT:  Mr. Jolie Gaona presents with marked stiffness, muscle spasm causing pain, weakness, gait and balance abnormalities following R ENRRIQUE. He had a hip fx with subsequent fusion so he has motion precautions going forward. He will benefit from skilled PT to address these issues and allow pt to return to normal function safely and without pain limitations. It will also enable him to have the R knee replaced once the hip is healed. PROBLEM LIST (Impacting functional limitations): 1. Decreased Strength 2. Decreased ADL/Functional Activities 3. Decreased Transfer Abilities 4. Decreased Ambulation Ability/Technique 5. Decreased Balance 6. Increased Pain 7. Decreased Activity Tolerance 8. Decreased Flexibility/Joint Mobility 9. Decreased [place] with Home Exercise Program INTERVENTIONS PLANNED: 
1. Balance Exercise 2. Bed Mobility 3. Cryotherapy 4. Home Exercise Program (HEP) 5. Manual Therapy 6. Range of Motion (ROM) 7. Therapeutic Activites 8. Therapeutic Exercise/Strengthening 9. aquatics TREATMENT PLAN: 
Effective Dates: 2/25/2021 TO 5/26/2021 (90 days). Frequency/Duration: 2-3 times a week for 90 Days GOALS: (Goals have been discussed and agreed upon with patient.) Short-Term Functional Goals: Time Frame: 4 weeks 1. Pt to report compliance with HEP 2. Pt to increase quad strength to consistently stand with knee extended. 3. Pt to increase flexibility to restore erect stance posture. 4. Pt to increase strength to amb level surfaces without assistive device. Discharge Goals: Time Frame: 12 weeks 1. Pt to increase strength for reciprocal gait on stairs 2. Pt to increase SLS > 10 sec for safe amb all surfaces Rehabilitation Potential For Stated Goals: Good Regarding Subhash Minor's therapy, I certify that the treatment plan above will be carried out by a therapist or under their direction. Thank you for this referral, 
Mima Holloway, PT Referring Physician Signature: Mary Kate Falcon,*              Date HISTORY:  
History of Present Injury/Illness (Reason for Referral): 
R hip fx when 19. It was fused. 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 He goes to pain management in [facility] for his knees. He was getting injections. L did well, R didn't help much. When he hip heals he will have the L knee done. R hip is aching less, more mobility. Sleeping in the bed. Didn't use a walker before surgery. Leg feels stable. Still gets groin pain and pain at incision. Past Medical History/Comorbidities:  
Mr. Jill Hurt  has a past medical history of Arthritis, Current every day smoker, Diabetes [facility]), History of priapism, Hypertension, Poor historian, and Psychiatric disorder (2019). He also has no past medical history of COPD, Dementia, Endocrine disease, Gastrointestinal disorder, Infectious disease, Neurological disorder, Other ill-defined conditions(799.89), or Sleep disorder. Mr. Jill Hurt  has a past surgical history that includes hx orthopaedic and hx colonoscopy. Social History/Living Environment:  
  pt lives in a single story home Prior Level of Function/Work/Activity: 
disabled Dominant Side: LEFT Current Medications:   
Current Outpatient Medications:  
  acetaminophen (TYLENOL) 500 mg tablet, Take 1,000 mg by mouth every six (6) hours as needed for Pain., Disp: , Rfl:  
  HYDROmorphone (DILAUDID) 2 mg tablet, Take 2 mg by mouth every four (4) hours as needed for Pain., Disp: , Rfl:  
  atorvastatin (LIPITOR) 80 mg tablet, Take 80 mg by mouth daily. , Disp: , Rfl:  
  AMLODIPINE BESYLATE, BULK,, Take 10 mg by mouth daily. , Disp: , Rfl:  
  docusate sodium (COLACE) 100 mg capsule, Take 100 mg by mouth three (3) times daily as needed for Constipation. , Disp: , Rfl:  
  sennosides (Laxative, sennosides,) 25 mg tab, Take 50 mg by mouth daily as needed (constipation). , Disp: , Rfl:  
  multivit-min/iron/folic acid/K (ADULTS MULTIVITAMIN PO), Take 1 Gum by mouth daily. , Disp: , Rfl:  
  hydroCHLOROthiazide (HYDRODIURIL) 25 mg tablet, Take 25 mg by mouth daily. , Disp: , Rfl:  
  aspirin delayed-release 81 mg tablet, Take 1 Tab by mouth every twelve (12) hours every twelve (12) hours for 35 days. , Disp: 70 Tab, Rfl: 0 
  cholecalciferol, vitamin D3, (Vitamin D3) 50 mcg (2,000 unit) tab, Take  by mouth daily.  Indications: prevention of vitamin D deficiency, Disp: , Rfl:  
   atorvastatin (LIPITOR) 40 mg tablet, Take 40 mg by mouth nightly. Indications: high cholesterol, Disp: , Rfl:  
  nicotine (NICODERM CQ) 14 mg/24 hr patch, 1 Patch by TransDERmal route every twenty-four (24) hours. Indications: stop smoking, Disp: , Rfl:  
  lisinopril (PRINIVIL, ZESTRIL) 20 mg tablet, Take 30 mg by mouth daily. Indications: high blood pressure, Disp: , Rfl:  
  amLODIPine (NORVASC) 5 mg tablet, Take 1 Tab by mouth daily. (Patient taking differently: Take 5 mg by mouth daily. Take / use AM day of surgery  per anesthesia protocols. Indications: high blood pressure), Disp: 30 Tab, Rfl: 0 Date Last Reviewed:  2/25/2021 # of Personal Factors/Comorbidities that affect the Plan of Care: 1-2: MODERATE COMPLEXITY EXAMINATION:  
Observation/Orthostatic Postural Assessment:   
      Trunk flex, hip flex, knee flexed posture in stance;  Supine must keep R hip flexed due to contracture, back arches and SB R 
Palpation:   
      Spasm and tenderness R hip flex, quad ROM:   
     AROM  Hip flex L 0-96, R 43-80 Strength:   
      2/5 R hip flex, 5/5 knee ext; Unable to assess B hip abd, hip ext;  L LE 5/5 hip flex, knee ext. Neurological Screen: 
      unremarkable Functional Mobility:  
      Gait/Ambulation:  Antalgic with flexed posture using rolling walker Transfers:  Keeps R LE out in front, pushes with UE's Bed Mobility:  Holds breath and uses UE's exclusively for position changes Stairs:  Avoids Balance:   
      Unable to perform currently Body Structures Involved: 1. Joints 2. Muscles Body Functions Affected: 1. Sensory/Pain 2. Movement Related Activities and Participation Affected: 1. General Tasks and Demands 2. Mobility 3. Self Care 4. Domestic Life 5. Interpersonal Interactions and Relationships 6. Community, Social and [place] # of elements that affect the Plan of Care: 3: MODERATE COMPLEXITY CLINICAL PRESENTATION:  
 Presentation: Evolving clinical presentation with changing clinical characteristics: MODERATE COMPLEXITY CLINICAL DECISION MAKING:  
Tool Used: Lower Extremity Functional Scale (LEFS) Score:  Initial: 12/80 Most Recent: X/80 (Date: -- ) Interpretation of Score: 20 questions each scored on a 5 point scale with 0 representing \"extreme difficulty or unable to perform\" and 4 representing \"no difficulty\". The lower the score, the greater the functional disability. 80/80 represents no disability. Minimal detectable change is 9 points. Medical Necessity:  
· Patient demonstrates good rehab potential due to higher previous functional level. Reason for Services/Other Comments: 
· Patient continues to require present interventions due to patient's inability to function normally and safely without pain or weakness limiting him. Use of outcome tool(s) and clinical judgement create a POC that gives a: Questionable prediction of patient's progress: MODERATE COMPLEXITY Total Treatment Duration: PT Patient Time In/Time Out Time In: 1000 Time Out: 1100 [facility], PT

## 2021-02-25 NOTE — PROGRESS NOTES
Mingo Wilkes  : 1965  Primary: Trinidad Shi Medicare Complete  Secondary:  Renée Mercado [address]  [phone]   [phone]      OUTPATIENT PHYSICAL THERAPY: Daily Treatment Note  2021   Pain in right hip (M25.551) and Stiffness of right hip, not elsewhere classified (M25.651), Low back pain (M54.5) and Stiffness of unspecified joint, not elsewhere classified (M25.60) and Muscle weakness (generalized) (M62.81) and Other abnormalities of gait and mobility (R26.89)     R ENRRIQUE 21 do not push motion  Effective Dates: 2021 TO 2021 (90 days). Frequency/Duration: 2-3 times a week for 90 Days  GOALS: (Goals have been discussed and agreed upon with patient.)  Short-Term Functional Goals: Time Frame: 4 weeks  1. Pt to report compliance with HEP  2. Pt to increase quad strength to consistently stand with knee extended. 3. Pt to increase flexibility to restore erect stance posture. 4. Pt to increase strength to amb level surfaces without assistive device. Discharge Goals: Time Frame: 12 weeks  1. Pt to increase strength for reciprocal gait on stairs  2. Pt to increase SLS > 10 sec for safe amb all surfaces  _______________________________________________________________________________  Pre-treatment Symptoms/Complaints:  Mr. Dav Guy presents with marked stiffness, muscle spasm causing pain, weakness, gait and balance abnormalities following R ENRRIQUE. He had a hip fx with subsequent fusion so he has motion precautions going forward. He will benefit from skilled PT to address these issues and allow pt to return to normal function safely and without pain limitations. It will also enable him to have the R knee replaced once the hip is healed.   Pain: Initial: 8/10 Post Session:  4/10   Medications Last Reviewed:  2021    Updated Objective Findings:      Observation/Orthostatic Postural Assessment:          Trunk flex, hip flex, knee flexed posture in stance;  Supine must keep R hip flexed due to contracture, back arches and SB R  Palpation:          Spasm and tenderness R hip flex, quad  ROM:         AROM  Hip flex L 0-96, R 43-80  Strength:         2/5 R hip flex, 5/5 knee ext; Unable to assess B hip abd, hip ext;  L LE 5/5 hip flex, knee ext. Functional Mobility:         Gait/Ambulation:  Antalgic with flexed posture using rolling walker        Transfers:  Keeps R LE out in front, pushes with UE's        Bed Mobility:  Holds breath and uses UE's exclusively for position changes        Stairs:  Avoids   Balance:          Unable to perform currently    Tool Used: Lower Extremity Functional Scale (LEFS)  Score:  Initial: 12/80 Most Recent: X/80 (Date: -- )   Interpretation of Score: 20 questions each scored on a 5 point scale with 0 representing \"extreme difficulty or unable to perform\" and 4 representing \"no difficulty\". The lower the score, the greater the functional disability. 80/80 represents no disability. Minimal detectable change is 9 points. See evaluation note from today     TREATMENT:   THERAPEUTIC ACTIVITY: ( see below for minutes): Therapeutic activities per grid below to improve mobility. Required moderate verbal and manual cues to improve functional mobility . THERAPEUTIC EXERCISE: (see below for minutes):  Exercises per grid below to improve strength. Required moderate verbal and manual cues to promote proper body alignment, promote proper body posture, promote proper body mechanics and promote proper body breathing techniques. Progressed resistance, range, repetitions and complexity of movement as indicated. MANUAL THERAPY: (see below for minutes): Joint mobilization and Soft tissue mobilization was utilized and necessary because of the patient's restricted joint motion, painful spasm, loss of articular motion and restricted motion of soft tissue.    MODALITIES: (see below for minutes):      for pain modulation    AQUATIC THERAPY (see below for minutes): Aquatic treatment performed per flow grid for Decreased muscle strength, Decreased endurance, Decreased static/dynamic balance and reactive control, Decreased activity endurance, Decompression, Ease of movement and Low impact and reduced weight bearing activity. Date: 2/25/21       Modalities: 15 mins       Game ready R thigh seated               Manual Therapy: 10 mins       STM R hip flex, quad  supine with support under knee               Aquatics Activities:        GAIT (F/B/S/M)        SLR gait        Hamstring Curl gait         Rockettes        Squats        Calf raises        Hamstring stretch B        Piriformis stretch B        Step ups ant B        Deep well                                Therapeutic Exercises: 20 mins       Pt education, postural education, HEP, functional breathing, bed mobility 10 mins       Quad set R X 10       Passive stretch hip flex/knee ext supine       LAQ R X 20       Standing with knee/hip ext  In front of mirror               HEP: LAQ, erect stance engaging quads, current HEP from [address] [place] [address] Portal  Treatment/Session Summary:    · Response to Treatment:  Pt had good understanding and harvey to information presented. · Communication/Consultation:  None today  · Equipment provided today:  None today  · Recommendations/Intent for next treatment session: Next visit will focus on strengthening, balance, gait, hip flex/quad flexibility as indicated.     Total Treatment Billable Duration:  60 mins  PT Patient Time In/Time Out  Time In: 1000  Time Out: [id number] [address],     Future Appointments   Date Time Provider Lisha Villagomez   3/2/2021  3:00 PM Calista Holloway PT [facility]   3/3/2021  2:15 PM Calista Holloway PT [facility]   3/5/2021  9:30 AM Calista Holloway PT [facility]   3/8/2021  2:15 PM Sarah Hensley, PTA [facility]   3/10/2021  2:15 PM Sarah Hensley PTA [facility]   3/12/2021  8:00 AM Amie, Veronica Duffel, PT [facility]   3/15/2021  2:15 PM Isela Hensley, PTA [facility]   3/17/2021  8:00 AM Amie, Veronica Duffel, PT [facility]   3/19/2021  2:15 PM Isela Hensley, PTA [facility]   3/22/2021  8:00 AM Musella, Veronica Duffel, PT [facility]   3/24/2021  8:00 AM Amie, Veronica Duffel, PT [facility]   3/26/2021  8:00 AM Musella, Veronica Duffel, PT [facility]   3/29/2021  8:00 AM Amie, Veronica Duffel, PT [facility]   3/31/2021  8:00 AM Amie, Veronica Duffel, PT [facility]   4/13/2021 10:00 AM Sean Gill NP [facility]

## 2021-03-02 ENCOUNTER — HOSPITAL ENCOUNTER (OUTPATIENT)
Dept: PHYSICAL THERAPY | Age: 56
Discharge: HOME | End: 2021-03-02
Payer: MEDICARE

## 2021-03-03 ENCOUNTER — HOSPITAL ENCOUNTER (OUTPATIENT)
Dept: PHYSICAL THERAPY | Age: 56
Discharge: HOME | End: 2021-03-03
Payer: MEDICARE

## 2021-03-03 PROCEDURE — 97113 AQUATIC THERAPY/EXERCISES: CPT

## 2021-03-03 PROCEDURE — 97016 VASOPNEUMATIC DEVICE THERAPY: CPT

## 2021-03-11 NOTE — THERAPY DISCHARGE
David Blake  
(:1965) [address] @ [address] [facility] [address], [address] [phone]   [phone] Discontinuation summary REFERRING PHYSICIAN: Jennifer Coyne,* 
MEDICAL/REFERRING DIAGNOSIS: 
· Presence of right artificial hip joint [Z96.641] ATTENDANCE: David Blake has attended 2 out of 6 visits, with 1 cancellation(s) and 3 no shows. ASSESSMENT:DATE: 3/11/2021 PROGRESS: David Blake attended 2 visits. He cancelled one appt and no showed for the last 3 visits. He states he cannot afford his copay and wants to contact his insurance company about switching to a free plan but he has not done that yet. He requested to be removed from the schedule at this time until he can change his insurance plan. RECOMMENDATIONS: Discharge patient due to patient request.  He was encouraged to continue with his HEP and call with any questions.  
 
Thank you for this referral, 
 
Kya Holloway, PT

## 2021-03-12 ENCOUNTER — HOSPITAL ENCOUNTER (OUTPATIENT)
Dept: PHYSICAL THERAPY | Age: 56
Discharge: HOME | End: 2021-03-12
Payer: MEDICARE

## 2021-03-15 ENCOUNTER — APPOINTMENT (OUTPATIENT)
Dept: PHYSICAL THERAPY | Age: 56
End: 2021-03-15
Payer: MEDICARE

## 2021-03-17 ENCOUNTER — APPOINTMENT (OUTPATIENT)
Dept: PHYSICAL THERAPY | Age: 56
End: 2021-03-17
Payer: MEDICARE

## 2021-03-19 ENCOUNTER — APPOINTMENT (OUTPATIENT)
Dept: PHYSICAL THERAPY | Age: 56
End: 2021-03-19
Payer: MEDICARE

## 2021-03-22 ENCOUNTER — APPOINTMENT (OUTPATIENT)
Dept: PHYSICAL THERAPY | Age: 56
End: 2021-03-22
Payer: MEDICARE

## 2021-03-24 ENCOUNTER — APPOINTMENT (OUTPATIENT)
Dept: PHYSICAL THERAPY | Age: 56
End: 2021-03-24
Payer: MEDICARE

## 2021-03-26 ENCOUNTER — APPOINTMENT (OUTPATIENT)
Dept: PHYSICAL THERAPY | Age: 56
End: 2021-03-26
Payer: MEDICARE

## 2021-03-29 ENCOUNTER — APPOINTMENT (OUTPATIENT)
Dept: PHYSICAL THERAPY | Age: 56
End: 2021-03-29
Payer: MEDICARE

## 2021-03-31 ENCOUNTER — APPOINTMENT (OUTPATIENT)
Dept: PHYSICAL THERAPY | Age: 56
End: 2021-03-31
Payer: MEDICARE

## 2022-03-19 PROBLEM — R79.89 ELEVATED SERUM CREATININE: Status: ACTIVE | Noted: 2021-01-13

## 2022-03-19 PROBLEM — M16.11 OSTEOARTHRITIS OF RIGHT HIP: Status: ACTIVE | Noted: 2021-02-02

## 2022-03-20 PROBLEM — R06.83 SNORING: Status: ACTIVE | Noted: 2021-01-13

## 2023-06-19 ENCOUNTER — TRANSCRIBE ORDERS (OUTPATIENT)
Dept: SCHEDULING | Age: 58
End: 2023-06-19

## 2023-06-19 DIAGNOSIS — N50.89 SCROTAL SWELLING: Primary | ICD-10-CM

## 2023-06-20 ENCOUNTER — HOSPITAL ENCOUNTER (EMERGENCY)
Age: 58
Discharge: HOME | End: 2023-06-20
Attending: EMERGENCY MEDICINE
Payer: MEDICARE

## 2023-06-20 ENCOUNTER — TRANSCRIBE ORDERS (OUTPATIENT)
Dept: SCHEDULING | Age: 58
End: 2023-06-20

## 2023-06-20 ENCOUNTER — APPOINTMENT (OUTPATIENT)
Dept: CT IMAGING | Age: 58
End: 2023-06-20
Payer: MEDICARE

## 2023-06-20 VITALS
TEMPERATURE: 99.1 F | HEIGHT: 70 IN | OXYGEN SATURATION: 100 % | DIASTOLIC BLOOD PRESSURE: 113 MMHG | SYSTOLIC BLOOD PRESSURE: 167 MMHG | RESPIRATION RATE: 16 BRPM | HEART RATE: 81 BPM | BODY MASS INDEX: 32.21 KG/M2 | WEIGHT: 225 LBS

## 2023-06-20 DIAGNOSIS — R60.1 ANASARCA: Primary | ICD-10-CM

## 2023-06-20 DIAGNOSIS — N18.9 CHRONIC KIDNEY DISEASE, UNSPECIFIED CKD STAGE: ICD-10-CM

## 2023-06-20 DIAGNOSIS — K40.90 INGUINAL HERNIA WITHOUT OBSTRUCTION OR GANGRENE, RECURRENCE NOT SPECIFIED, UNSPECIFIED LATERALITY: Primary | ICD-10-CM

## 2023-06-20 LAB
ALBUMIN SERPL-MCNC: 2 G/DL (ref 3.5–5)
ALBUMIN/GLOB SERPL: 0.4 (ref 0.4–1.6)
ALP SERPL-CCNC: 73 U/L (ref 50–136)
ALT SERPL-CCNC: 7 U/L (ref 12–65)
ANION GAP SERPL CALC-SCNC: 6 MMOL/L (ref 2–11)
APPEARANCE UR: CLEAR
AST SERPL-CCNC: 15 U/L (ref 15–37)
BACTERIA URNS QL MICRO: 0 /HPF
BASOPHILS # BLD: 0.1 K/UL (ref 0–0.2)
BASOPHILS NFR BLD: 2 % (ref 0–2)
BILIRUB SERPL-MCNC: 0.2 MG/DL (ref 0.2–1.1)
BILIRUB UR QL: NEGATIVE
BUN SERPL-MCNC: 69 MG/DL (ref 6–23)
CALCIUM SERPL-MCNC: 8.2 MG/DL (ref 8.3–10.4)
CASTS URNS QL MICRO: ABNORMAL /LPF
CHLORIDE SERPL-SCNC: 110 MMOL/L (ref 101–110)
CO2 SERPL-SCNC: 18 MMOL/L (ref 21–32)
COLOR UR: ABNORMAL
CREAT SERPL-MCNC: 8.2 MG/DL (ref 0.8–1.5)
CRYSTALS URNS QL MICRO: 0 /LPF
DIFFERENTIAL METHOD BLD: ABNORMAL
EOSINOPHIL # BLD: 0.5 K/UL (ref 0–0.8)
EOSINOPHIL NFR BLD: 10 % (ref 0.5–7.8)
EPI CELLS #/AREA URNS HPF: ABNORMAL /HPF
ERYTHROCYTE [DISTWIDTH] IN BLOOD BY AUTOMATED COUNT: 14.7 % (ref 11.9–14.6)
GLOBULIN SER CALC-MCNC: 5.1 G/DL (ref 2.8–4.5)
GLUCOSE SERPL-MCNC: 105 MG/DL (ref 65–100)
GLUCOSE UR STRIP.AUTO-MCNC: NEGATIVE MG/DL
HCT VFR BLD AUTO: 27.4 % (ref 41.1–50.3)
HGB BLD-MCNC: 8.9 G/DL (ref 13.6–17.2)
HGB UR QL STRIP: ABNORMAL
IMM GRANULOCYTES # BLD AUTO: 0 K/UL (ref 0–0.5)
IMM GRANULOCYTES NFR BLD AUTO: 0 % (ref 0–5)
KETONES UR QL STRIP.AUTO: NEGATIVE MG/DL
LEUKOCYTE ESTERASE UR QL STRIP.AUTO: ABNORMAL
LYMPHOCYTES # BLD: 0.8 K/UL (ref 0.5–4.6)
LYMPHOCYTES NFR BLD: 18 % (ref 13–44)
MCH RBC QN AUTO: 32 PG (ref 26.1–32.9)
MCHC RBC AUTO-ENTMCNC: 32.5 G/DL (ref 31.4–35)
MCV RBC AUTO: 98.6 FL (ref 82–102)
MONOCYTES # BLD: 0.7 K/UL (ref 0.1–1.3)
MONOCYTES NFR BLD: 15 % (ref 4–12)
MUCOUS THREADS URNS QL MICRO: 0 /LPF
NEUTS SEG # BLD: 2.6 K/UL (ref 1.7–8.2)
NEUTS SEG NFR BLD: 55 % (ref 43–78)
NITRITE UR QL STRIP.AUTO: NEGATIVE
NRBC # BLD: 0 K/UL (ref 0–0.2)
PH UR STRIP: 5.5 (ref 5–9)
PLATELET # BLD AUTO: 222 K/UL (ref 150–450)
PMV BLD AUTO: 10.5 FL (ref 9.4–12.3)
POTASSIUM SERPL-SCNC: 5 MMOL/L (ref 3.5–5.1)
PROT SERPL-MCNC: 7.1 G/DL (ref 6.3–8.2)
PROT UR STRIP-MCNC: 300 MG/DL
RBC # BLD AUTO: 2.78 M/UL (ref 4.23–5.6)
RBC #/AREA URNS HPF: ABNORMAL /HPF
SODIUM SERPL-SCNC: 134 MMOL/L (ref 133–143)
SP GR UR REFRACTOMETRY: 1.01 (ref 1–1.02)
URINE CULTURE IF INDICATED: ABNORMAL
UROBILINOGEN UR QL STRIP.AUTO: 0.2 EU/DL (ref 0.2–1)
WBC # BLD AUTO: 4.7 K/UL (ref 4.3–11.1)
WBC URNS QL MICRO: ABNORMAL /HPF

## 2023-06-20 PROCEDURE — 6360000002 HC RX W HCPCS: Performed by: EMERGENCY MEDICINE

## 2023-06-20 PROCEDURE — 74176 CT ABD & PELVIS W/O CONTRAST: CPT

## 2023-06-20 PROCEDURE — 85025 COMPLETE CBC W/AUTO DIFF WBC: CPT

## 2023-06-20 PROCEDURE — 81001 URINALYSIS AUTO W/SCOPE: CPT

## 2023-06-20 PROCEDURE — 96374 THER/PROPH/DIAG INJ IV PUSH: CPT

## 2023-06-20 PROCEDURE — 80053 COMPREHEN METABOLIC PANEL: CPT

## 2023-06-20 PROCEDURE — 99284 EMERGENCY DEPT VISIT MOD MDM: CPT

## 2023-06-20 RX ORDER — FUROSEMIDE 10 MG/ML
40 INJECTION INTRAMUSCULAR; INTRAVENOUS ONCE
Status: COMPLETED | OUTPATIENT
Start: 2023-06-20 | End: 2023-06-20

## 2023-06-20 RX ADMIN — FUROSEMIDE 40 MG: 10 INJECTION, SOLUTION INTRAMUSCULAR; INTRAVENOUS at 12:55

## 2023-06-20 ASSESSMENT — ENCOUNTER SYMPTOMS
BACK PAIN: 0
ABDOMINAL PAIN: 0
SHORTNESS OF BREATH: 0
EYE DISCHARGE: 0
DIARRHEA: 0
NAUSEA: 0
COUGH: 0
VOMITING: 0
CHEST TIGHTNESS: 0

## 2023-06-20 ASSESSMENT — PAIN - FUNCTIONAL ASSESSMENT: PAIN_FUNCTIONAL_ASSESSMENT: 0-10

## 2023-06-20 ASSESSMENT — LIFESTYLE VARIABLES
HOW OFTEN DO YOU HAVE A DRINK CONTAINING ALCOHOL: 4 OR MORE TIMES A WEEK
HOW MANY STANDARD DRINKS CONTAINING ALCOHOL DO YOU HAVE ON A TYPICAL DAY: 1 OR 2

## 2023-06-20 ASSESSMENT — PAIN DESCRIPTION - ORIENTATION: ORIENTATION: LEFT

## 2023-06-20 ASSESSMENT — PAIN SCALES - GENERAL: PAINLEVEL_OUTOF10: 8

## 2023-06-20 ASSESSMENT — PAIN DESCRIPTION - LOCATION: LOCATION: KNEE

## 2023-06-20 NOTE — ED PROVIDER NOTES
I talked to the patient here in the emergency department. He is not having any shortness of breath. I reviewed his pending CT which just shows anasarca but no acute surgical condition. He did get a call from the [address] [place] [address] dialysis clinic and they are going to try to get him in tomorrow to flush his PD catheter so they can get him started on dialysis. Patient is agreeable with this plan.      Mckenna Puente, DO  06/20/23 [id number]

## 2023-06-20 NOTE — ED NOTES
I have reviewed discharge instructions with the patient. The patient verbalized understanding. Patient left ED via Discharge Method: stretcher to Home with [address] for questions and clarification provided. Patient given 0 scripts. To continue your aftercare when you leave the hospital, you may receive an automated call from our care team to check in on how you are doing. This is a free service and part of our promise to provide the best care and service to meet your aftercare needs.  If you have questions, or wish to unsubscribe from this service please call [phone]. Thank you for Choosing our [facility] Emergency Department.         Konstantin Greco RN  06/20/23 0656

## 2023-06-20 NOTE — DISCHARGE INSTRUCTIONS
Keep working with your nephrologist and your dialysis clinic to get started on the dialysis soon as possible. If at any point in time you develop shortness of breath or new concerning symptoms please return to the ER immediately.

## 2023-06-20 NOTE — ED PROVIDER NOTES
Emergency Department Provider Note       PCP: Rajni Bond DO   Age: 56 y.o. Sex: male     [address] [facility]    1. Anasarca  R60.1       2. Chronic kidney disease, unspecified CKD stage  N18.9           Medical Decision Making       53-year-old male presents to the emergency department via EMS for chief complaint of generalized edema. He reports having swelling in his lower extremity abdomen and testicle region. Patient states that he had recently had surgery to a have a peritoneal dialysis catheter placed on June 16. He is not on any type of diuretics at home. He states that he still makes urine he is also complaining of some dysuria. Vital signs are reviewed. Patient is slightly hypertensive. Otherwise he is in no acute distress. Basic lab work here was obtained. CBC showed no elevation white blood cell count, hemoglobin 8.9 which is up from prior of 8.4. CMP showed a creatinine of 8.2 which is chronic in nature as his prior was 8.6. I did call and speak with the nephrologist on-call Dr. Rachel Pedersen. He states that the patient as long he is not having any respiratory or electrolyte abnormalities can follow-up with his established [facility] clinic for initiation of his peritoneal dialysis and for short course of some diuretics. Patient was handed over to my colleague Dr. Jesse Camp at 1500 pending CT scan imaging and urinalysis and final disposition. Complexity of Problems Addressed:  1 or more acute illnesses that pose a threat to life or bodily function. Data Reviewed and Analyzed:  Category 1:   I independently ordered and reviewed each unique test.  I reviewed external records: ED visit note from an outside group. I reviewed external records: provider visit note from PCP. I reviewed external records: provider visit note from outside specialist.   The patients assessment required an independent historian: EMS. The reason they were needed is Pre hospital report.            History

## 2023-06-20 NOTE — ED TRIAGE NOTES
Pt via EMS from home. Pt was discharged from [facility] medical Friday. Pt reports testicle enlargement starting Saturday with painful urination. Pt denies redness but reports there are sore.     /100, hx of hypertension, , , T 98.5, 02 100 RA    Hx kidney failure

## (undated) DEVICE — YANKAUER,BULB TIP,W/O VENT,RIGID,STERILE: Brand: MEDLINE

## (undated) DEVICE — HANDPIECE SET WITH BONE CLEANING TIP AND SUCTION TUBE: Brand: INTERPULSE

## (undated) DEVICE — PRECISION THIN (9.0 X 0.38 X 31.0MM)

## (undated) DEVICE — SOLUTION IRRIG 3000ML 0.9% SOD CHL FLX CONT 0797208] ICU MEDICAL INC]

## (undated) DEVICE — SUTURE PDS II SZ 1 L54IN ABSRB VLT L65MM TP-1 1/2 CIR Z879G

## (undated) DEVICE — (D)PREP SKN CHLRAPRP APPL 26ML -- CONVERT TO ITEM 371833

## (undated) DEVICE — X-RAY CASSETTE COVER: Brand: CONVERTORS

## (undated) DEVICE — TUBING, SUCTION, 1/4" X 10', STRAIGHT: Brand: MEDLINE

## (undated) DEVICE — SUTURE ETHBND EXCEL SZ 5 L30IN NONABSORBABLE GRN L40MM V-37 MB66G

## (undated) DEVICE — SYR LR LCK 1ML GRAD NSAF 30ML --

## (undated) DEVICE — 1840 FOAM BLOCK NEEDLE COUNTER: Brand: DEVON

## (undated) DEVICE — SOLUTION IV 1000ML 0.9% SOD CHL

## (undated) DEVICE — BUTTON SWITCH PENCIL BLADE ELECTRODE, HOLSTER: Brand: EDGE

## (undated) DEVICE — SUTURE VCRL SZ 2-0 L27IN ABSRB UD L26MM CT-2 1/2 CIR J269H

## (undated) DEVICE — T4 HOOD

## (undated) DEVICE — RETRIEVER SUT ARTHSCP HOFFEE --

## (undated) DEVICE — Device: Brand: STABLECUT®

## (undated) DEVICE — SUTURE MCRYL SZ 2-0 L27IN ABSRB UD CP-1 1 L36MM 1/2 CIR REV Y266H

## (undated) DEVICE — SUTURE PDS II SZ 1 L36IN ABSRB VLT L48MM CTX 1/2 CIR Z371T

## (undated) DEVICE — TOTAL 1-LAYER TRAY, LATEX FOLEY, 16FR 10: Brand: MEDLINE

## (undated) DEVICE — 3000CC GUARDIAN II: Brand: GUARDIAN

## (undated) DEVICE — PAD,NON-ADHERENT,3X8,STERILE,LF,1/PK: Brand: MEDLINE

## (undated) DEVICE — 4.0MM EGG BUR

## (undated) DEVICE — JELLY LUBRICATING 10GM PREFIL SYR LUBE

## (undated) DEVICE — DRAPE,HIP,W/POUCHES,STERILE: Brand: MEDLINE

## (undated) DEVICE — BLADE SAW W12.5XL70MM THK0.8MM CUT THK1.12MM S STL RECIP

## (undated) DEVICE — 2000CC GUARDIAN II: Brand: GUARDIAN

## (undated) DEVICE — SYR 50ML LR LCK 1ML GRAD NSAF --

## (undated) DEVICE — SURGICAL PROCEDURE PACK TOT HIP CDS

## (undated) DEVICE — REM POLYHESIVE ADULT PATIENT RETURN ELECTRODE: Brand: VALLEYLAB

## (undated) DEVICE — GOWN,AURORA,FABRIC-REINFORCED,2XL: Brand: MEDLINE

## (undated) DEVICE — NEEDLE HYPO 18GA L1.5IN PNK S STL HUB POLYPR SHLD REG BVL

## (undated) DEVICE — TRAY PREP DRY W/ PREM GLV 2 APPL 6 SPNG 2 UNDPD 1 OVERWRAP

## (undated) DEVICE — STOCKINETTE TUBE 6X48 -- MEDICHOICE

## (undated) DEVICE — DRAPE,U/SHT,SPLIT,FILM,60X84,STERILE: Brand: MEDLINE

## (undated) DEVICE — TAPERED ROUTER

## (undated) DEVICE — SYSTEM SKIN CLSR 22CM DERMBND PRINEO